# Patient Record
Sex: MALE | Race: WHITE | NOT HISPANIC OR LATINO | Employment: OTHER | RURAL
[De-identification: names, ages, dates, MRNs, and addresses within clinical notes are randomized per-mention and may not be internally consistent; named-entity substitution may affect disease eponyms.]

---

## 2017-01-03 RX ORDER — CLONIDINE HYDROCHLORIDE 0.3 MG/1
TABLET ORAL
Qty: 60 TABLET | Refills: 0 | Status: SHIPPED | OUTPATIENT
Start: 2017-01-03 | End: 2018-01-01 | Stop reason: HOSPADM

## 2017-01-13 ENCOUNTER — OFFICE VISIT (OUTPATIENT)
Dept: FAMILY MEDICINE CLINIC | Facility: CLINIC | Age: 72
End: 2017-01-13

## 2017-01-13 VITALS
BODY MASS INDEX: 37.32 KG/M2 | SYSTOLIC BLOOD PRESSURE: 137 MMHG | HEART RATE: 88 BPM | RESPIRATION RATE: 18 BRPM | HEIGHT: 71 IN | WEIGHT: 266.6 LBS | DIASTOLIC BLOOD PRESSURE: 88 MMHG | OXYGEN SATURATION: 98 % | TEMPERATURE: 97.6 F

## 2017-01-13 DIAGNOSIS — N18.3 CKD (CHRONIC KIDNEY DISEASE), STAGE 3 (MODERATE): ICD-10-CM

## 2017-01-13 DIAGNOSIS — R06.00 DYSPNEA, UNSPECIFIED TYPE: ICD-10-CM

## 2017-01-13 DIAGNOSIS — E11.9 DIABETES MELLITUS TYPE 2, INSULIN DEPENDENT (HCC): Primary | ICD-10-CM

## 2017-01-13 DIAGNOSIS — I10 ESSENTIAL HYPERTENSION: ICD-10-CM

## 2017-01-13 DIAGNOSIS — Z79.4 DIABETES MELLITUS TYPE 2, INSULIN DEPENDENT (HCC): Primary | ICD-10-CM

## 2017-01-13 DIAGNOSIS — F32.A DEPRESSION, UNSPECIFIED DEPRESSION TYPE: ICD-10-CM

## 2017-01-13 DIAGNOSIS — K21.9 GASTROESOPHAGEAL REFLUX DISEASE, ESOPHAGITIS PRESENCE NOT SPECIFIED: ICD-10-CM

## 2017-01-13 PROCEDURE — 99214 OFFICE O/P EST MOD 30 MIN: CPT | Performed by: FAMILY MEDICINE

## 2017-01-13 RX ORDER — CITALOPRAM 10 MG/1
10 TABLET ORAL DAILY
Qty: 30 TABLET | Refills: 11 | Status: SHIPPED | OUTPATIENT
Start: 2017-01-13 | End: 2017-01-13

## 2017-01-13 RX ORDER — ESOMEPRAZOLE MAGNESIUM 10 MG/1
10 GRANULE, FOR SUSPENSION, EXTENDED RELEASE ORAL
Qty: 1 PACKET | Refills: 11 | Status: ON HOLD | OUTPATIENT
Start: 2017-01-13 | End: 2018-01-01

## 2017-01-13 RX ORDER — DULOXETIN HYDROCHLORIDE 60 MG/1
60 CAPSULE, DELAYED RELEASE ORAL DAILY
Qty: 30 CAPSULE | Status: CANCELLED | OUTPATIENT
Start: 2017-01-13

## 2017-01-13 RX ORDER — ALBUTEROL SULFATE 90 UG/1
2 AEROSOL, METERED RESPIRATORY (INHALATION)
Qty: 1 INHALER | Refills: 11 | Status: SHIPPED | OUTPATIENT
Start: 2017-01-13

## 2017-01-13 NOTE — MR AVS SNAPSHOT
Dustin Moulton   1/13/2017 1:15 PM   Office Visit    Dept Phone:  [phone]   Encounter #:  [id number]    Provider:  Doc Cadena MD   Department:  [facility] [place]                Your Full Care Plan              Today's Medication Changes          These changes are accurate as of: 1/13/17  1:29 PM.  If you have any questions, ask your nurse or doctor.               New Medication(s)Ordered:     esomeprazole 10 MG packet   Commonly known as:  NEXIUM   Take 10 mg by mouth Every Morning Before Breakfast.   Started by:  Doc Cadena MD         Stop taking medication(s)listed here:     traZODone 50 MG tablet   Commonly known as:  DESYREL   Stopped by:  Doc Cadena MD                Where to Get Your Medications      These medications were sent to [facility] - [address] - [phone]  - [phone] [address], [facility] [address]     Phone:  [phone]     albuterol 108 (90 BASE) MCG/ACT inhaler    esomeprazole 10 MG packet                  Your Updated Medication List          This list is accurate as of: 1/13/17  1:29 PM.  Always use your most recent med list.                albuterol 108 (90 BASE) MCG/ACT inhaler   Commonly known as:  PROAIR HFA   Inhale 2 puffs 4 (Four) Times a Day.       amLODIPine 10 MG tablet   Commonly known as:  NORVASC   Take 1 tablet by mouth Daily.       atorvastatin 40 MG tablet   Commonly known as:  LIPITOR       * CHLORDIAZEPOXIDE-CLIDINIUM PO       * clidinium-chlordiazePOXIDE 5-2.5 MG per capsule   Commonly known as:  LIBRAX   TAKE 1 CAPSULE FOUR TIMES DAILY       CloNIDine 0.3 MG tablet   Commonly known as:  CATAPRES   TAKE 1 TABLET TWICE DAILY.       esomeprazole 10 MG packet   Commonly known as:  NEXIUM   Take 10 mg by mouth Every Morning Before Breakfast.       fluticasone 50 MCG/ACT nasal spray   Commonly known as:  FLONASE      USE 2 SPRAYS IN EACH NOSTRIL DAILY.       FLUZONE HIGH-DOSE 0.5 ML suspension prefilled syringe injection   Generic drug:  influenza vac split high-dose       * HUMALOG 100 UNIT/ML solution cartridge   Generic drug:  Insulin Lispro       * HUMALOG KWIKPEN 100 UNIT/ML solution pen-injector   Generic drug:  Insulin Lispro       hydrALAZINE 10 MG tablet   Commonly known as:  APRESOLINE       LANTUS 100 UNIT/ML injection   Generic drug:  insulin glargine       levothyroxine 25 MCG tablet   Commonly known as:  SYNTHROID, LEVOTHROID       * Pen Needles 31G X 6 MM misc       * B-D UF III MINI PEN NEEDLES 31G X 5 MM misc   Generic drug:  Insulin Pen Needle   USE 4 TIMES DAILY.       SPS 15 GM/60ML suspension   Generic drug:  sodium polystyrene       tamsulosin 0.4 MG capsule 24 hr capsule   Commonly known as:  FLOMAX   Take 1 capsule by mouth Daily.       torsemide 20 MG tablet   Commonly known as:  DEMADEX       triamcinolone 0.1 % ointment   Commonly known as:  KENALOG       VITAMIN D2 PO       * Notice:  This list has 6 medication(s) that are the same as other medications prescribed for you. Read the directions carefully, and ask your doctor or other care provider to review them with you.            Instructions     None    Patient Instructions History      Upcoming Appointments     Visit Type Date Time Department    OFFICE VISIT 1/13/2017  1:15 PM [facility]    OFFICE VISIT 2/13/2017  2:00 PM [facility]      cooala - your brandshart Signup     Our records indicate that you have declined [facility]oala [facility] sign[facility]. If you would like to sign up for [employer], please email [email or web address] or call [phone] to obtain an activation code.             Other Info from Your Visit           Your Appointments     Feb 13, 2017  2:00 PM CST   Office Visit with Doc Cadena MD   [facility] MEDICAL [facility] (--)    [address] [place]  "[id number]  [facility] [address] [phone]           Arrive 15 minutes prior to appointment.              Allergies     Darvon [Propoxyphene]      Meperidine And Related      Morphine And Related      Other      Darvocet      Reason for Visit     Follow-up 3 month      Vital Signs     Blood Pressure Pulse Temperature Respirations Height    137/88 (BP Location: Right arm, Patient Position: Sitting, Cuff Size: Adult) 88 97.6 °F (36.4 °C) (Tympanic) 18 70.5\" (179.1 cm)    Weight Oxygen Saturation Body Mass Index Smoking Status       266 lb 9.6 oz (121 kg) 98% 37.71 kg/m2 Former Smoker         "

## 2017-01-19 ENCOUNTER — TELEPHONE (OUTPATIENT)
Dept: FAMILY MEDICINE CLINIC | Facility: CLINIC | Age: 72
End: 2017-01-19

## 2017-01-19 NOTE — TELEPHONE ENCOUNTER
Ishan with [facility] called and stated that patient is reporting increase in low back víctor over the last few days.  He stated that he had had a history of back surgery x 3 several years ago.  He had a fa,, on 12/6/16 and it has been increasing since then.  He is requesting a referral to pain management. I do not see anything mentioned in his office notes that he has stated he has back pain.

## 2017-01-23 DIAGNOSIS — M54.5 CHRONIC LOW BACK PAIN, UNSPECIFIED BACK PAIN LATERALITY, WITH SCIATICA PRESENCE UNSPECIFIED: Primary | ICD-10-CM

## 2017-01-23 DIAGNOSIS — G89.29 CHRONIC LOW BACK PAIN, UNSPECIFIED BACK PAIN LATERALITY, WITH SCIATICA PRESENCE UNSPECIFIED: Primary | ICD-10-CM

## 2017-02-01 RX ORDER — AMLODIPINE BESYLATE 10 MG/1
10 TABLET ORAL DAILY
Qty: 90 TABLET | Refills: 3 | Status: SHIPPED | OUTPATIENT
Start: 2017-02-01

## 2017-04-12 RX ORDER — FLURBIPROFEN SODIUM 0.3 MG/ML
SOLUTION/ DROPS OPHTHALMIC
Qty: 120 EACH | Refills: 11 | Status: SHIPPED | OUTPATIENT
Start: 2017-04-12

## 2017-06-22 RX ORDER — ATORVASTATIN CALCIUM 40 MG/1
TABLET, FILM COATED ORAL
Qty: 30 TABLET | Refills: 0 | Status: SHIPPED | OUTPATIENT
Start: 2017-06-22 | End: 2017-08-22 | Stop reason: SDUPTHER

## 2017-08-22 RX ORDER — ATORVASTATIN CALCIUM 40 MG/1
TABLET, FILM COATED ORAL
Qty: 30 TABLET | Refills: 0 | Status: SHIPPED | OUTPATIENT
Start: 2017-08-22

## 2018-01-01 ENCOUNTER — READMISSION MANAGEMENT (OUTPATIENT)
Dept: CALL CENTER | Facility: HOSPITAL | Age: 73
End: 2018-01-01

## 2018-01-01 ENCOUNTER — TELEPHONE (OUTPATIENT)
Dept: FAMILY MEDICINE CLINIC | Facility: CLINIC | Age: 73
End: 2018-01-01

## 2018-01-01 ENCOUNTER — APPOINTMENT (OUTPATIENT)
Dept: CT IMAGING | Facility: HOSPITAL | Age: 73
End: 2018-01-01

## 2018-01-01 ENCOUNTER — APPOINTMENT (OUTPATIENT)
Dept: CARDIOLOGY | Facility: HOSPITAL | Age: 73
End: 2018-01-01
Attending: INTERNAL MEDICINE

## 2018-01-01 ENCOUNTER — HOSPITAL ENCOUNTER (OUTPATIENT)
Facility: HOSPITAL | Age: 73
Setting detail: OBSERVATION
Discharge: HOME | End: 2018-06-03
Attending: EMERGENCY MEDICINE | Admitting: EMERGENCY MEDICINE

## 2018-01-01 ENCOUNTER — APPOINTMENT (OUTPATIENT)
Dept: GENERAL RADIOLOGY | Facility: HOSPITAL | Age: 73
End: 2018-01-01

## 2018-01-01 ENCOUNTER — OFFICE VISIT (OUTPATIENT)
Dept: FAMILY MEDICINE CLINIC | Facility: CLINIC | Age: 73
End: 2018-01-01

## 2018-01-01 ENCOUNTER — LAB (OUTPATIENT)
Dept: LAB | Facility: HOSPITAL | Age: 73
End: 2018-01-01

## 2018-01-01 ENCOUNTER — HOSPITAL ENCOUNTER (INPATIENT)
Facility: HOSPITAL | Age: 73
LOS: 5 days | Discharge: HOME | End: 2018-08-25
Attending: EMERGENCY MEDICINE | Admitting: HOSPITALIST

## 2018-01-01 ENCOUNTER — NURSE TRIAGE (OUTPATIENT)
Dept: CALL CENTER | Facility: HOSPITAL | Age: 73
End: 2018-01-01

## 2018-01-01 VITALS
SYSTOLIC BLOOD PRESSURE: 105 MMHG | WEIGHT: 245 LBS | RESPIRATION RATE: 20 BRPM | DIASTOLIC BLOOD PRESSURE: 53 MMHG | HEIGHT: 71 IN | HEART RATE: 62 BPM | OXYGEN SATURATION: 98 % | TEMPERATURE: 98.4 F | BODY MASS INDEX: 34.3 KG/M2

## 2018-01-01 VITALS
WEIGHT: 234.2 LBS | DIASTOLIC BLOOD PRESSURE: 68 MMHG | HEART RATE: 57 BPM | RESPIRATION RATE: 18 BRPM | HEIGHT: 72 IN | OXYGEN SATURATION: 97 % | TEMPERATURE: 97.6 F | SYSTOLIC BLOOD PRESSURE: 145 MMHG | BODY MASS INDEX: 31.72 KG/M2

## 2018-01-01 VITALS
HEART RATE: 91 BPM | OXYGEN SATURATION: 97 % | BODY MASS INDEX: 32.13 KG/M2 | HEIGHT: 72 IN | SYSTOLIC BLOOD PRESSURE: 134 MMHG | WEIGHT: 237.25 LBS | DIASTOLIC BLOOD PRESSURE: 62 MMHG

## 2018-01-01 DIAGNOSIS — Z79.4 DIABETES MELLITUS TYPE 2, INSULIN DEPENDENT (HCC): ICD-10-CM

## 2018-01-01 DIAGNOSIS — N17.9 AKI (ACUTE KIDNEY INJURY) (HCC): Primary | ICD-10-CM

## 2018-01-01 DIAGNOSIS — E03.9 HYPOTHYROIDISM, UNSPECIFIED TYPE: ICD-10-CM

## 2018-01-01 DIAGNOSIS — N18.9 CHRONIC KIDNEY DISEASE, UNSPECIFIED CKD STAGE: ICD-10-CM

## 2018-01-01 DIAGNOSIS — E11.9 DIABETES MELLITUS TYPE 2, INSULIN DEPENDENT (HCC): ICD-10-CM

## 2018-01-01 DIAGNOSIS — J18.9 PNEUMONIA OF RIGHT UPPER LOBE DUE TO INFECTIOUS ORGANISM: Primary | ICD-10-CM

## 2018-01-01 DIAGNOSIS — I16.0 HYPERTENSIVE URGENCY: ICD-10-CM

## 2018-01-01 DIAGNOSIS — I21.4 NSTEMI (NON-ST ELEVATED MYOCARDIAL INFARCTION) (HCC): Primary | ICD-10-CM

## 2018-01-01 DIAGNOSIS — I10 UNCONTROLLED HYPERTENSION: ICD-10-CM

## 2018-01-01 DIAGNOSIS — I10 ESSENTIAL HYPERTENSION: ICD-10-CM

## 2018-01-01 LAB
ALBUMIN SERPL-MCNC: 3.7 G/DL (ref 3.4–4.8)
ALBUMIN SERPL-MCNC: 4 G/DL (ref 3.4–4.8)
ALBUMIN SERPL-MCNC: 4.1 G/DL (ref 3.4–4.8)
ALBUMIN/GLOB SERPL: 1.1 G/DL (ref 1.1–1.8)
ALBUMIN/GLOB SERPL: 1.3 G/DL (ref 1.1–1.8)
ALBUMIN/GLOB SERPL: 1.3 G/DL (ref 1.1–1.8)
ALP SERPL-CCNC: 107 U/L (ref 38–126)
ALP SERPL-CCNC: 42 U/L (ref 38–126)
ALP SERPL-CCNC: 73 U/L (ref 38–126)
ALT SERPL W P-5'-P-CCNC: 17 U/L (ref 21–72)
ALT SERPL W P-5'-P-CCNC: 26 U/L (ref 21–72)
ALT SERPL W P-5'-P-CCNC: 30 U/L (ref 21–72)
AMYLASE SERPL-CCNC: 38 U/L (ref 50–130)
ANION GAP SERPL CALCULATED.3IONS-SCNC: 13 MMOL/L (ref 5–15)
ANION GAP SERPL CALCULATED.3IONS-SCNC: 15 MMOL/L (ref 5–15)
ANION GAP SERPL CALCULATED.3IONS-SCNC: 6 MMOL/L (ref 5–15)
ANION GAP SERPL CALCULATED.3IONS-SCNC: 7 MMOL/L (ref 5–15)
ANION GAP SERPL CALCULATED.3IONS-SCNC: 8 MMOL/L (ref 5–15)
ANION GAP SERPL CALCULATED.3IONS-SCNC: 9 MMOL/L (ref 5–15)
ARTICHOKE IGE QN: 87 MG/DL (ref 1–129)
AST SERPL-CCNC: 23 U/L (ref 17–59)
AST SERPL-CCNC: 26 U/L (ref 17–59)
AST SERPL-CCNC: 26 U/L (ref 17–59)
BACTERIA BLD CULT: ABNORMAL
BACTERIA SPEC AEROBE CULT: ABNORMAL
BACTERIA SPEC AEROBE CULT: NORMAL
BACTERIA SPEC RESP CULT: NORMAL
BACTERIA UR QL AUTO: ABNORMAL /HPF
BACTERIA UR QL AUTO: NORMAL /HPF
BASOPHILS # BLD AUTO: 0.02 10*3/MM3 (ref 0–0.2)
BASOPHILS # BLD AUTO: 0.03 10*3/MM3 (ref 0–0.2)
BASOPHILS # BLD AUTO: 0.03 10*3/MM3 (ref 0–0.2)
BASOPHILS # BLD AUTO: 0.04 10*3/MM3 (ref 0–0.2)
BASOPHILS # BLD AUTO: 0.05 10*3/MM3 (ref 0–0.2)
BASOPHILS # BLD AUTO: 0.05 10*3/MM3 (ref 0–0.2)
BASOPHILS # BLD AUTO: 0.06 10*3/MM3 (ref 0–0.2)
BASOPHILS # BLD AUTO: 0.06 10*3/MM3 (ref 0–0.2)
BASOPHILS NFR BLD AUTO: 0.3 % (ref 0–2)
BASOPHILS NFR BLD AUTO: 0.3 % (ref 0–2)
BASOPHILS NFR BLD AUTO: 0.4 % (ref 0–2)
BASOPHILS NFR BLD AUTO: 0.5 % (ref 0–2)
BASOPHILS NFR BLD AUTO: 0.6 % (ref 0–2)
BASOPHILS NFR BLD AUTO: 0.6 % (ref 0–2)
BH CV ECHO MEAS - ACS: 1.8 CM
BH CV ECHO MEAS - AO MAX PG (FULL): 3 MMHG
BH CV ECHO MEAS - AO MAX PG: 11.3 MMHG
BH CV ECHO MEAS - AO MEAN PG (FULL): 1 MMHG
BH CV ECHO MEAS - AO MEAN PG: 5 MMHG
BH CV ECHO MEAS - AO ROOT AREA (BSA CORRECTED): 1.6
BH CV ECHO MEAS - AO ROOT AREA: 9.6 CM^2
BH CV ECHO MEAS - AO ROOT DIAM: 3.5 CM
BH CV ECHO MEAS - AO V2 MAX: 168 CM/SEC
BH CV ECHO MEAS - AO V2 MEAN: 106 CM/SEC
BH CV ECHO MEAS - AO V2 VTI: 35.2 CM
BH CV ECHO MEAS - AVA(I,A): 2.6 CM^2
BH CV ECHO MEAS - AVA(I,D): 2.6 CM^2
BH CV ECHO MEAS - AVA(V,A): 2.7 CM^2
BH CV ECHO MEAS - AVA(V,D): 2.7 CM^2
BH CV ECHO MEAS - BSA(HAYCOCK): 2.3 M^2
BH CV ECHO MEAS - BSA: 2.2 M^2
BH CV ECHO MEAS - BZI_BMI: 32.2 KILOGRAMS/M^2
BH CV ECHO MEAS - BZI_METRIC_HEIGHT: 180.3 CM
BH CV ECHO MEAS - BZI_METRIC_WEIGHT: 104.8 KG
BH CV ECHO MEAS - EDV(CUBED): 77.3 ML
BH CV ECHO MEAS - EDV(TEICH): 81.3 ML
BH CV ECHO MEAS - EF(CUBED): 76.7 %
BH CV ECHO MEAS - EF(TEICH): 69.1 %
BH CV ECHO MEAS - ESV(CUBED): 18 ML
BH CV ECHO MEAS - ESV(TEICH): 25.1 ML
BH CV ECHO MEAS - FS: 38.5 %
BH CV ECHO MEAS - IVS/LVPW: 1
BH CV ECHO MEAS - IVSD: 1.3 CM
BH CV ECHO MEAS - LA DIMENSION: 4.7 CM
BH CV ECHO MEAS - LA/AO: 1.3
BH CV ECHO MEAS - LV MASS(C)D: 200.2 GRAMS
BH CV ECHO MEAS - LV MASS(C)DI: 89.3 GRAMS/M^2
BH CV ECHO MEAS - LV MAX PG: 8.3 MMHG
BH CV ECHO MEAS - LV MEAN PG: 4 MMHG
BH CV ECHO MEAS - LV V1 MAX: 144 CM/SEC
BH CV ECHO MEAS - LV V1 MEAN: 89.5 CM/SEC
BH CV ECHO MEAS - LV V1 VTI: 29.3 CM
BH CV ECHO MEAS - LVIDD: 4.3 CM
BH CV ECHO MEAS - LVIDS: 2.6 CM
BH CV ECHO MEAS - LVOT AREA (M): 3.1 CM^2
BH CV ECHO MEAS - LVOT AREA: 3.1 CM^2
BH CV ECHO MEAS - LVOT DIAM: 2 CM
BH CV ECHO MEAS - LVPWD: 1.3 CM
BH CV ECHO MEAS - MR MAX PG: 107.7 MMHG
BH CV ECHO MEAS - MR MAX VEL: 519 CM/SEC
BH CV ECHO MEAS - MV A MAX VEL: 141 CM/SEC
BH CV ECHO MEAS - MV DEC SLOPE: 735 CM/SEC^2
BH CV ECHO MEAS - MV E MAX VEL: 118 CM/SEC
BH CV ECHO MEAS - MV E/A: 0.84
BH CV ECHO MEAS - MV MAX PG: 8.5 MMHG
BH CV ECHO MEAS - MV MEAN PG: 3 MMHG
BH CV ECHO MEAS - MV P1/2T MAX VEL: 123 CM/SEC
BH CV ECHO MEAS - MV P1/2T: 49 MSEC
BH CV ECHO MEAS - MV V2 MAX: 146 CM/SEC
BH CV ECHO MEAS - MV V2 MEAN: 79.9 CM/SEC
BH CV ECHO MEAS - MV V2 VTI: 42.7 CM
BH CV ECHO MEAS - MVA P1/2T LCG: 1.8 CM^2
BH CV ECHO MEAS - MVA(P1/2T): 4.5 CM^2
BH CV ECHO MEAS - MVA(VTI): 2.2 CM^2
BH CV ECHO MEAS - PA MAX PG: 8.3 MMHG
BH CV ECHO MEAS - PA V2 MAX: 144 CM/SEC
BH CV ECHO MEAS - RAP SYSTOLE: 5 MMHG
BH CV ECHO MEAS - RVDD: 2.3 CM
BH CV ECHO MEAS - RVSP: 22.5 MMHG
BH CV ECHO MEAS - SI(AO): 151 ML/M^2
BH CV ECHO MEAS - SI(CUBED): 26.5 ML/M^2
BH CV ECHO MEAS - SI(LVOT): 41.1 ML/M^2
BH CV ECHO MEAS - SI(TEICH): 25.1 ML/M^2
BH CV ECHO MEAS - SV(AO): 338.7 ML
BH CV ECHO MEAS - SV(CUBED): 59.3 ML
BH CV ECHO MEAS - SV(LVOT): 92 ML
BH CV ECHO MEAS - SV(TEICH): 56.2 ML
BH CV ECHO MEAS - TR MAX VEL: 209 CM/SEC
BILIRUB SERPL-MCNC: 0.2 MG/DL (ref 0.2–1.3)
BILIRUB SERPL-MCNC: 0.9 MG/DL (ref 0.2–1.3)
BILIRUB SERPL-MCNC: 1.2 MG/DL (ref 0.2–1.3)
BILIRUB UR QL STRIP: NEGATIVE
BILIRUB UR QL STRIP: NEGATIVE
BUN BLD-MCNC: 31 MG/DL (ref 7–21)
BUN BLD-MCNC: 31 MG/DL (ref 7–21)
BUN BLD-MCNC: 33 MG/DL (ref 7–21)
BUN BLD-MCNC: 33 MG/DL (ref 7–21)
BUN BLD-MCNC: 34 MG/DL (ref 7–21)
BUN BLD-MCNC: 48 MG/DL (ref 7–21)
BUN BLD-MCNC: 50 MG/DL (ref 7–21)
BUN BLD-MCNC: 51 MG/DL (ref 7–21)
BUN BLD-MCNC: 52 MG/DL (ref 7–21)
BUN/CREAT SERPL: 13.9 (ref 7–25)
BUN/CREAT SERPL: 14.2 (ref 7–25)
BUN/CREAT SERPL: 14.7 (ref 7–25)
BUN/CREAT SERPL: 15.4 (ref 7–25)
BUN/CREAT SERPL: 15.8 (ref 7–25)
BUN/CREAT SERPL: 16 (ref 7–25)
BUN/CREAT SERPL: 16.6 (ref 7–25)
BUN/CREAT SERPL: 17.3 (ref 7–25)
BUN/CREAT SERPL: 17.5 (ref 7–25)
BUN/CREAT SERPL: 17.8 (ref 7–25)
BUN/CREAT SERPL: 17.9 (ref 7–25)
CALCIUM SPEC-SCNC: 8.1 MG/DL (ref 8.4–10.2)
CALCIUM SPEC-SCNC: 8.2 MG/DL (ref 8.4–10.2)
CALCIUM SPEC-SCNC: 8.3 MG/DL (ref 8.4–10.2)
CALCIUM SPEC-SCNC: 8.4 MG/DL (ref 8.4–10.2)
CALCIUM SPEC-SCNC: 8.5 MG/DL (ref 8.4–10.2)
CALCIUM SPEC-SCNC: 8.6 MG/DL (ref 8.4–10.2)
CALCIUM SPEC-SCNC: 8.7 MG/DL (ref 8.4–10.2)
CALCIUM SPEC-SCNC: 8.7 MG/DL (ref 8.4–10.2)
CALCIUM SPEC-SCNC: 8.9 MG/DL (ref 8.4–10.2)
CALCIUM SPEC-SCNC: 9.4 MG/DL (ref 8.4–10.2)
CALCIUM SPEC-SCNC: 9.4 MG/DL (ref 8.4–10.2)
CHLORIDE SERPL-SCNC: 100 MMOL/L (ref 95–110)
CHLORIDE SERPL-SCNC: 101 MMOL/L (ref 95–110)
CHLORIDE SERPL-SCNC: 102 MMOL/L (ref 95–110)
CHLORIDE SERPL-SCNC: 103 MMOL/L (ref 95–110)
CHLORIDE SERPL-SCNC: 104 MMOL/L (ref 95–110)
CHLORIDE SERPL-SCNC: 104 MMOL/L (ref 95–110)
CHLORIDE SERPL-SCNC: 105 MMOL/L (ref 95–110)
CHLORIDE SERPL-SCNC: 107 MMOL/L (ref 95–110)
CHLORIDE SERPL-SCNC: 107 MMOL/L (ref 95–110)
CHOLEST SERPL-MCNC: 159 MG/DL (ref 0–199)
CLARITY UR: CLEAR
CLARITY UR: CLEAR
CO2 SERPL-SCNC: 16 MMOL/L (ref 22–31)
CO2 SERPL-SCNC: 17 MMOL/L (ref 22–31)
CO2 SERPL-SCNC: 19 MMOL/L (ref 22–31)
CO2 SERPL-SCNC: 20 MMOL/L (ref 22–31)
CO2 SERPL-SCNC: 20 MMOL/L (ref 22–31)
CO2 SERPL-SCNC: 21 MMOL/L (ref 22–31)
CO2 SERPL-SCNC: 22 MMOL/L (ref 22–31)
CO2 SERPL-SCNC: 23 MMOL/L (ref 22–31)
COLOR UR: YELLOW
COLOR UR: YELLOW
CREAT BLD-MCNC: 2.05 MG/DL (ref 0.7–1.3)
CREAT BLD-MCNC: 2.09 MG/DL (ref 0.7–1.3)
CREAT BLD-MCNC: 2.12 MG/DL (ref 0.7–1.3)
CREAT BLD-MCNC: 2.18 MG/DL (ref 0.7–1.3)
CREAT BLD-MCNC: 2.21 MG/DL (ref 0.7–1.3)
CREAT BLD-MCNC: 2.23 MG/DL (ref 0.7–1.3)
CREAT BLD-MCNC: 2.24 MG/DL (ref 0.7–1.3)
CREAT BLD-MCNC: 2.7 MG/DL (ref 0.7–1.3)
CREAT BLD-MCNC: 2.85 MG/DL (ref 0.7–1.3)
CREAT BLD-MCNC: 2.9 MG/DL (ref 0.7–1.3)
CREAT BLD-MCNC: 2.95 MG/DL (ref 0.7–1.3)
D-LACTATE SERPL-SCNC: 1.8 MMOL/L (ref 0.5–2)
DEPRECATED RDW RBC AUTO: 40 FL (ref 35.1–43.9)
DEPRECATED RDW RBC AUTO: 40.4 FL (ref 35.1–43.9)
DEPRECATED RDW RBC AUTO: 40.7 FL (ref 35.1–43.9)
DEPRECATED RDW RBC AUTO: 40.7 FL (ref 35.1–43.9)
DEPRECATED RDW RBC AUTO: 42.2 FL (ref 35.1–43.9)
DEPRECATED RDW RBC AUTO: 42.3 FL (ref 35.1–43.9)
DEPRECATED RDW RBC AUTO: 42.6 FL (ref 35.1–43.9)
DEPRECATED RDW RBC AUTO: 43.3 FL (ref 35.1–43.9)
DEPRECATED RDW RBC AUTO: 43.4 FL (ref 35.1–43.9)
DEPRECATED RDW RBC AUTO: 44.2 FL (ref 35.1–43.9)
DEPRECATED RDW RBC AUTO: 46.1 FL (ref 35.1–43.9)
EOSINOPHIL # BLD AUTO: 0.17 10*3/MM3 (ref 0–0.7)
EOSINOPHIL # BLD AUTO: 0.2 10*3/MM3 (ref 0–0.7)
EOSINOPHIL # BLD AUTO: 0.21 10*3/MM3 (ref 0–0.7)
EOSINOPHIL # BLD AUTO: 0.25 10*3/MM3 (ref 0–0.7)
EOSINOPHIL # BLD AUTO: 0.25 10*3/MM3 (ref 0–0.7)
EOSINOPHIL # BLD AUTO: 0.26 10*3/MM3 (ref 0–0.7)
EOSINOPHIL # BLD AUTO: 0.37 10*3/MM3 (ref 0–0.7)
EOSINOPHIL # BLD AUTO: 0.52 10*3/MM3 (ref 0–0.7)
EOSINOPHIL # BLD AUTO: 0.53 10*3/MM3 (ref 0–0.7)
EOSINOPHIL # BLD AUTO: 0.56 10*3/MM3 (ref 0–0.7)
EOSINOPHIL # BLD AUTO: 0.6 10*3/MM3 (ref 0–0.7)
EOSINOPHIL NFR BLD AUTO: 1 % (ref 0–7)
EOSINOPHIL NFR BLD AUTO: 2 % (ref 0–7)
EOSINOPHIL NFR BLD AUTO: 3 % (ref 0–7)
EOSINOPHIL NFR BLD AUTO: 3.1 % (ref 0–7)
EOSINOPHIL NFR BLD AUTO: 3.4 % (ref 0–7)
EOSINOPHIL NFR BLD AUTO: 5.7 % (ref 0–7)
EOSINOPHIL NFR BLD AUTO: 5.8 % (ref 0–7)
EOSINOPHIL NFR BLD AUTO: 5.8 % (ref 0–7)
EOSINOPHIL NFR BLD AUTO: 6 % (ref 0–7)
ERYTHROCYTE [DISTWIDTH] IN BLOOD BY AUTOMATED COUNT: 12.4 % (ref 11.5–14.5)
ERYTHROCYTE [DISTWIDTH] IN BLOOD BY AUTOMATED COUNT: 12.5 % (ref 11.5–14.5)
ERYTHROCYTE [DISTWIDTH] IN BLOOD BY AUTOMATED COUNT: 12.7 % (ref 11.5–14.5)
ERYTHROCYTE [DISTWIDTH] IN BLOOD BY AUTOMATED COUNT: 12.8 % (ref 11.5–14.5)
ERYTHROCYTE [DISTWIDTH] IN BLOOD BY AUTOMATED COUNT: 12.9 % (ref 11.5–14.5)
ERYTHROCYTE [DISTWIDTH] IN BLOOD BY AUTOMATED COUNT: 13 % (ref 11.5–14.5)
ERYTHROCYTE [DISTWIDTH] IN BLOOD BY AUTOMATED COUNT: 13.3 % (ref 11.5–14.5)
GFR SERPL CREATININE-BSD FRML MDRD: 21 ML/MIN/1.73 (ref 42–98)
GFR SERPL CREATININE-BSD FRML MDRD: 21 ML/MIN/1.73 (ref 42–98)
GFR SERPL CREATININE-BSD FRML MDRD: 22 ML/MIN/1.73 (ref 42–98)
GFR SERPL CREATININE-BSD FRML MDRD: 23 ML/MIN/1.73 (ref 42–98)
GFR SERPL CREATININE-BSD FRML MDRD: 29 ML/MIN/1.73 (ref 42–98)
GFR SERPL CREATININE-BSD FRML MDRD: 30 ML/MIN/1.73 (ref 42–98)
GFR SERPL CREATININE-BSD FRML MDRD: 31 ML/MIN/1.73 (ref 42–98)
GFR SERPL CREATININE-BSD FRML MDRD: 31 ML/MIN/1.73 (ref 42–98)
GFR SERPL CREATININE-BSD FRML MDRD: 32 ML/MIN/1.73 (ref 42–98)
GLOBULIN UR ELPH-MCNC: 2.8 GM/DL (ref 2.3–3.5)
GLOBULIN UR ELPH-MCNC: 3.2 GM/DL (ref 2.3–3.5)
GLOBULIN UR ELPH-MCNC: 3.6 GM/DL (ref 2.3–3.5)
GLUCOSE BLD-MCNC: 103 MG/DL (ref 60–100)
GLUCOSE BLD-MCNC: 105 MG/DL (ref 60–100)
GLUCOSE BLD-MCNC: 107 MG/DL (ref 60–100)
GLUCOSE BLD-MCNC: 110 MG/DL (ref 60–100)
GLUCOSE BLD-MCNC: 167 MG/DL (ref 60–100)
GLUCOSE BLD-MCNC: 194 MG/DL (ref 60–100)
GLUCOSE BLD-MCNC: 220 MG/DL (ref 60–100)
GLUCOSE BLD-MCNC: 231 MG/DL (ref 60–100)
GLUCOSE BLD-MCNC: 333 MG/DL (ref 60–100)
GLUCOSE BLD-MCNC: 94 MG/DL (ref 60–100)
GLUCOSE BLD-MCNC: 94 MG/DL (ref 60–100)
GLUCOSE BLDC GLUCOMTR-MCNC: 102 MG/DL (ref 70–130)
GLUCOSE BLDC GLUCOMTR-MCNC: 117 MG/DL (ref 70–130)
GLUCOSE BLDC GLUCOMTR-MCNC: 120 MG/DL (ref 70–130)
GLUCOSE BLDC GLUCOMTR-MCNC: 122 MG/DL (ref 70–130)
GLUCOSE BLDC GLUCOMTR-MCNC: 126 MG/DL (ref 70–130)
GLUCOSE BLDC GLUCOMTR-MCNC: 131 MG/DL (ref 70–130)
GLUCOSE BLDC GLUCOMTR-MCNC: 139 MG/DL (ref 70–130)
GLUCOSE BLDC GLUCOMTR-MCNC: 150 MG/DL (ref 70–130)
GLUCOSE BLDC GLUCOMTR-MCNC: 152 MG/DL (ref 70–130)
GLUCOSE BLDC GLUCOMTR-MCNC: 165 MG/DL (ref 70–130)
GLUCOSE BLDC GLUCOMTR-MCNC: 181 MG/DL (ref 70–130)
GLUCOSE BLDC GLUCOMTR-MCNC: 208 MG/DL (ref 70–130)
GLUCOSE BLDC GLUCOMTR-MCNC: 211 MG/DL (ref 70–130)
GLUCOSE BLDC GLUCOMTR-MCNC: 220 MG/DL (ref 70–130)
GLUCOSE BLDC GLUCOMTR-MCNC: 223 MG/DL (ref 70–130)
GLUCOSE BLDC GLUCOMTR-MCNC: 240 MG/DL (ref 70–130)
GLUCOSE BLDC GLUCOMTR-MCNC: 250 MG/DL (ref 70–130)
GLUCOSE BLDC GLUCOMTR-MCNC: 253 MG/DL (ref 70–130)
GLUCOSE BLDC GLUCOMTR-MCNC: 268 MG/DL (ref 70–130)
GLUCOSE BLDC GLUCOMTR-MCNC: 271 MG/DL (ref 70–130)
GLUCOSE BLDC GLUCOMTR-MCNC: 272 MG/DL (ref 70–130)
GLUCOSE BLDC GLUCOMTR-MCNC: 276 MG/DL (ref 70–130)
GLUCOSE BLDC GLUCOMTR-MCNC: 283 MG/DL (ref 70–130)
GLUCOSE BLDC GLUCOMTR-MCNC: 285 MG/DL (ref 70–130)
GLUCOSE BLDC GLUCOMTR-MCNC: 286 MG/DL (ref 70–130)
GLUCOSE BLDC GLUCOMTR-MCNC: 298 MG/DL (ref 70–130)
GLUCOSE BLDC GLUCOMTR-MCNC: 306 MG/DL (ref 70–130)
GLUCOSE BLDC GLUCOMTR-MCNC: 309 MG/DL (ref 70–130)
GLUCOSE BLDC GLUCOMTR-MCNC: 320 MG/DL (ref 70–130)
GLUCOSE BLDC GLUCOMTR-MCNC: 341 MG/DL (ref 70–130)
GLUCOSE BLDC GLUCOMTR-MCNC: 361 MG/DL (ref 70–130)
GLUCOSE BLDC GLUCOMTR-MCNC: 362 MG/DL (ref 70–130)
GLUCOSE BLDC GLUCOMTR-MCNC: 369 MG/DL (ref 70–130)
GLUCOSE BLDC GLUCOMTR-MCNC: 383 MG/DL (ref 70–130)
GLUCOSE BLDC GLUCOMTR-MCNC: 62 MG/DL (ref 70–130)
GLUCOSE UR STRIP-MCNC: ABNORMAL MG/DL
GLUCOSE UR STRIP-MCNC: NEGATIVE MG/DL
GRAM STN SPEC: ABNORMAL
GRAM STN SPEC: NORMAL
HBA1C MFR BLD: 7.6 % (ref 4–5.6)
HBA1C MFR BLD: 8.1 % (ref 4–5.6)
HCT VFR BLD AUTO: 26.8 % (ref 39–49)
HCT VFR BLD AUTO: 27 % (ref 39–49)
HCT VFR BLD AUTO: 27.4 % (ref 39–49)
HCT VFR BLD AUTO: 27.5 % (ref 39–49)
HCT VFR BLD AUTO: 28.9 % (ref 39–49)
HCT VFR BLD AUTO: 29.8 % (ref 39–49)
HCT VFR BLD AUTO: 30.4 % (ref 39–49)
HCT VFR BLD AUTO: 31.9 % (ref 39–49)
HCT VFR BLD AUTO: 32.4 % (ref 39–49)
HCT VFR BLD AUTO: 32.9 % (ref 39–49)
HCT VFR BLD AUTO: 33.3 % (ref 39–49)
HDLC SERPL-MCNC: 41 MG/DL (ref 60–200)
HGB BLD-MCNC: 10 G/DL (ref 13.7–17.3)
HGB BLD-MCNC: 10.2 G/DL (ref 13.7–17.3)
HGB BLD-MCNC: 10.3 G/DL (ref 13.7–17.3)
HGB BLD-MCNC: 10.7 G/DL (ref 13.7–17.3)
HGB BLD-MCNC: 10.7 G/DL (ref 13.7–17.3)
HGB BLD-MCNC: 10.8 G/DL (ref 13.7–17.3)
HGB BLD-MCNC: 11.4 G/DL (ref 13.7–17.3)
HGB BLD-MCNC: 11.9 G/DL (ref 13.7–17.3)
HGB BLD-MCNC: 12 G/DL (ref 13.7–17.3)
HGB BLD-MCNC: 9.3 G/DL (ref 13.7–17.3)
HGB BLD-MCNC: 9.8 G/DL (ref 13.7–17.3)
HGB UR QL STRIP.AUTO: NEGATIVE
HGB UR QL STRIP.AUTO: NEGATIVE
HOLD SPECIMEN: NORMAL
HYALINE CASTS UR QL AUTO: ABNORMAL /LPF
HYALINE CASTS UR QL AUTO: NORMAL /LPF
IMM GRANULOCYTES # BLD: 0.02 10*3/MM3 (ref 0–0.02)
IMM GRANULOCYTES # BLD: 0.03 10*3/MM3 (ref 0–0.02)
IMM GRANULOCYTES # BLD: 0.03 10*3/MM3 (ref 0–0.02)
IMM GRANULOCYTES # BLD: 0.04 10*3/MM3 (ref 0–0.02)
IMM GRANULOCYTES # BLD: 0.05 10*3/MM3 (ref 0–0.02)
IMM GRANULOCYTES # BLD: 0.07 10*3/MM3 (ref 0–0.02)
IMM GRANULOCYTES # BLD: 0.07 10*3/MM3 (ref 0–0.02)
IMM GRANULOCYTES # BLD: 0.08 10*3/MM3 (ref 0–0.02)
IMM GRANULOCYTES # BLD: 0.13 10*3/MM3 (ref 0–0.02)
IMM GRANULOCYTES NFR BLD: 0.2 % (ref 0–0.5)
IMM GRANULOCYTES NFR BLD: 0.4 % (ref 0–0.5)
IMM GRANULOCYTES NFR BLD: 0.5 % (ref 0–0.5)
IMM GRANULOCYTES NFR BLD: 0.5 % (ref 0–0.5)
IMM GRANULOCYTES NFR BLD: 0.6 % (ref 0–0.5)
IMM GRANULOCYTES NFR BLD: 0.6 % (ref 0–0.5)
IMM GRANULOCYTES NFR BLD: 0.8 % (ref 0–0.5)
IMM GRANULOCYTES NFR BLD: 0.9 % (ref 0–0.5)
IMM GRANULOCYTES NFR BLD: 1.2 % (ref 0–0.5)
KETONES UR QL STRIP: NEGATIVE
KETONES UR QL STRIP: NEGATIVE
L PNEUMO1 AG UR QL IA: NEGATIVE
LDLC/HDLC SERPL: 1.98 {RATIO} (ref 0–3.55)
LEUKOCYTE ESTERASE UR QL STRIP.AUTO: NEGATIVE
LEUKOCYTE ESTERASE UR QL STRIP.AUTO: NEGATIVE
LIPASE SERPL-CCNC: 25 U/L (ref 23–300)
LV EF 2D ECHO EST: 56 %
LYMPHOCYTES # BLD AUTO: 1.12 10*3/MM3 (ref 0.6–4.2)
LYMPHOCYTES # BLD AUTO: 1.22 10*3/MM3 (ref 0.6–4.2)
LYMPHOCYTES # BLD AUTO: 1.25 10*3/MM3 (ref 0.6–4.2)
LYMPHOCYTES # BLD AUTO: 1.29 10*3/MM3 (ref 0.6–4.2)
LYMPHOCYTES # BLD AUTO: 1.33 10*3/MM3 (ref 0.6–4.2)
LYMPHOCYTES # BLD AUTO: 1.4 10*3/MM3 (ref 0.6–4.2)
LYMPHOCYTES # BLD AUTO: 1.44 10*3/MM3 (ref 0.6–4.2)
LYMPHOCYTES # BLD AUTO: 1.45 10*3/MM3 (ref 0.6–4.2)
LYMPHOCYTES # BLD AUTO: 1.52 10*3/MM3 (ref 0.6–4.2)
LYMPHOCYTES # BLD AUTO: 1.55 10*3/MM3 (ref 0.6–4.2)
LYMPHOCYTES # BLD AUTO: 1.69 10*3/MM3 (ref 0.6–4.2)
LYMPHOCYTES NFR BLD AUTO: 10.9 % (ref 10–50)
LYMPHOCYTES NFR BLD AUTO: 11.2 % (ref 10–50)
LYMPHOCYTES NFR BLD AUTO: 12.6 % (ref 10–50)
LYMPHOCYTES NFR BLD AUTO: 16.2 % (ref 10–50)
LYMPHOCYTES NFR BLD AUTO: 16.2 % (ref 10–50)
LYMPHOCYTES NFR BLD AUTO: 16.9 % (ref 10–50)
LYMPHOCYTES NFR BLD AUTO: 17.3 % (ref 10–50)
LYMPHOCYTES NFR BLD AUTO: 17.4 % (ref 10–50)
LYMPHOCYTES NFR BLD AUTO: 17.9 % (ref 10–50)
LYMPHOCYTES NFR BLD AUTO: 19.7 % (ref 10–50)
LYMPHOCYTES NFR BLD AUTO: 8.4 % (ref 10–50)
MAXIMAL PREDICTED HEART RATE: 147 BPM
MCH RBC QN AUTO: 31.9 PG (ref 26.5–34)
MCH RBC QN AUTO: 32.1 PG (ref 26.5–34)
MCH RBC QN AUTO: 32.2 PG (ref 26.5–34)
MCH RBC QN AUTO: 32.3 PG (ref 26.5–34)
MCH RBC QN AUTO: 32.3 PG (ref 26.5–34)
MCH RBC QN AUTO: 32.4 PG (ref 26.5–34)
MCH RBC QN AUTO: 32.5 PG (ref 26.5–34)
MCH RBC QN AUTO: 32.6 PG (ref 26.5–34)
MCH RBC QN AUTO: 32.6 PG (ref 26.5–34)
MCH RBC QN AUTO: 32.8 PG (ref 26.5–34)
MCH RBC QN AUTO: 32.9 PG (ref 26.5–34)
MCHC RBC AUTO-ENTMCNC: 33.9 G/DL (ref 31.5–36.3)
MCHC RBC AUTO-ENTMCNC: 34.7 G/DL (ref 31.5–36.3)
MCHC RBC AUTO-ENTMCNC: 35.2 G/DL (ref 31.5–36.3)
MCHC RBC AUTO-ENTMCNC: 35.2 G/DL (ref 31.5–36.3)
MCHC RBC AUTO-ENTMCNC: 35.6 G/DL (ref 31.5–36.3)
MCHC RBC AUTO-ENTMCNC: 35.9 G/DL (ref 31.5–36.3)
MCHC RBC AUTO-ENTMCNC: 36 G/DL (ref 31.5–36.3)
MCHC RBC AUTO-ENTMCNC: 36.2 G/DL (ref 31.5–36.3)
MCHC RBC AUTO-ENTMCNC: 36.3 G/DL (ref 31.5–36.3)
MCHC RBC AUTO-ENTMCNC: 36.4 G/DL (ref 31.5–36.3)
MCHC RBC AUTO-ENTMCNC: 37.2 G/DL (ref 31.5–36.3)
MCV RBC AUTO: 88.1 FL (ref 80–98)
MCV RBC AUTO: 89.1 FL (ref 80–98)
MCV RBC AUTO: 89.4 FL (ref 80–98)
MCV RBC AUTO: 90 FL (ref 80–98)
MCV RBC AUTO: 90.5 FL (ref 80–98)
MCV RBC AUTO: 90.6 FL (ref 80–98)
MCV RBC AUTO: 90.8 FL (ref 80–98)
MCV RBC AUTO: 91.5 FL (ref 80–98)
MCV RBC AUTO: 92.7 FL (ref 80–98)
MCV RBC AUTO: 92.7 FL (ref 80–98)
MCV RBC AUTO: 94.9 FL (ref 80–98)
MONOCYTES # BLD AUTO: 0.72 10*3/MM3 (ref 0–0.9)
MONOCYTES # BLD AUTO: 0.81 10*3/MM3 (ref 0–0.9)
MONOCYTES # BLD AUTO: 0.82 10*3/MM3 (ref 0–0.9)
MONOCYTES # BLD AUTO: 0.9 10*3/MM3 (ref 0–0.9)
MONOCYTES # BLD AUTO: 0.98 10*3/MM3 (ref 0–0.9)
MONOCYTES # BLD AUTO: 1 10*3/MM3 (ref 0–0.9)
MONOCYTES # BLD AUTO: 1.04 10*3/MM3 (ref 0–0.9)
MONOCYTES # BLD AUTO: 1.08 10*3/MM3 (ref 0–0.9)
MONOCYTES # BLD AUTO: 1.13 10*3/MM3 (ref 0–0.9)
MONOCYTES # BLD AUTO: 1.34 10*3/MM3 (ref 0–0.9)
MONOCYTES # BLD AUTO: 1.66 10*3/MM3 (ref 0–0.9)
MONOCYTES NFR BLD AUTO: 10.1 % (ref 0–12)
MONOCYTES NFR BLD AUTO: 10.7 % (ref 0–12)
MONOCYTES NFR BLD AUTO: 10.7 % (ref 0–12)
MONOCYTES NFR BLD AUTO: 10.8 % (ref 0–12)
MONOCYTES NFR BLD AUTO: 11.1 % (ref 0–12)
MONOCYTES NFR BLD AUTO: 11.3 % (ref 0–12)
MONOCYTES NFR BLD AUTO: 11.6 % (ref 0–12)
MONOCYTES NFR BLD AUTO: 12.7 % (ref 0–12)
MONOCYTES NFR BLD AUTO: 9.7 % (ref 0–12)
NEUTROPHILS # BLD AUTO: 13.74 10*3/MM3 (ref 2–8.6)
NEUTROPHILS # BLD AUTO: 4.04 10*3/MM3 (ref 2–8.6)
NEUTROPHILS # BLD AUTO: 5.1 10*3/MM3 (ref 2–8.6)
NEUTROPHILS # BLD AUTO: 5.61 10*3/MM3 (ref 2–8.6)
NEUTROPHILS # BLD AUTO: 5.7 10*3/MM3 (ref 2–8.6)
NEUTROPHILS # BLD AUTO: 5.81 10*3/MM3 (ref 2–8.6)
NEUTROPHILS # BLD AUTO: 5.98 10*3/MM3 (ref 2–8.6)
NEUTROPHILS # BLD AUTO: 6.77 10*3/MM3 (ref 2–8.6)
NEUTROPHILS # BLD AUTO: 6.86 10*3/MM3 (ref 2–8.6)
NEUTROPHILS # BLD AUTO: 7.78 10*3/MM3 (ref 2–8.6)
NEUTROPHILS # BLD AUTO: 8.8 10*3/MM3 (ref 2–8.6)
NEUTROPHILS NFR BLD AUTO: 63.5 % (ref 37–80)
NEUTROPHILS NFR BLD AUTO: 64.8 % (ref 37–80)
NEUTROPHILS NFR BLD AUTO: 65.2 % (ref 37–80)
NEUTROPHILS NFR BLD AUTO: 65.7 % (ref 37–80)
NEUTROPHILS NFR BLD AUTO: 67.9 % (ref 37–80)
NEUTROPHILS NFR BLD AUTO: 68.3 % (ref 37–80)
NEUTROPHILS NFR BLD AUTO: 68.8 % (ref 37–80)
NEUTROPHILS NFR BLD AUTO: 69.6 % (ref 37–80)
NEUTROPHILS NFR BLD AUTO: 73.9 % (ref 37–80)
NEUTROPHILS NFR BLD AUTO: 76 % (ref 37–80)
NEUTROPHILS NFR BLD AUTO: 80.1 % (ref 37–80)
NITRITE UR QL STRIP: NEGATIVE
NITRITE UR QL STRIP: NEGATIVE
NRBC BLD MANUAL-RTO: 0 /100 WBC (ref 0–0)
NT-PROBNP SERPL-MCNC: 2290 PG/ML (ref 0–900)
PH UR STRIP.AUTO: 5.5 [PH] (ref 5–9)
PH UR STRIP.AUTO: <=5 [PH] (ref 5–9)
PLATELET # BLD AUTO: 236 10*3/MM3 (ref 150–450)
PLATELET # BLD AUTO: 236 10*3/MM3 (ref 150–450)
PLATELET # BLD AUTO: 243 10*3/MM3 (ref 150–450)
PLATELET # BLD AUTO: 245 10*3/MM3 (ref 150–450)
PLATELET # BLD AUTO: 249 10*3/MM3 (ref 150–450)
PLATELET # BLD AUTO: 252 10*3/MM3 (ref 150–450)
PLATELET # BLD AUTO: 255 10*3/MM3 (ref 150–450)
PLATELET # BLD AUTO: 263 10*3/MM3 (ref 150–450)
PLATELET # BLD AUTO: 266 10*3/MM3 (ref 150–450)
PLATELET # BLD AUTO: 269 10*3/MM3 (ref 150–450)
PLATELET # BLD AUTO: 273 10*3/MM3 (ref 150–450)
PMV BLD AUTO: 10 FL (ref 8–12)
PMV BLD AUTO: 10.2 FL (ref 8–12)
PMV BLD AUTO: 10.3 FL (ref 8–12)
PMV BLD AUTO: 10.5 FL (ref 8–12)
PMV BLD AUTO: 10.8 FL (ref 8–12)
PMV BLD AUTO: 11 FL (ref 8–12)
PMV BLD AUTO: 9.5 FL (ref 8–12)
PMV BLD AUTO: 9.8 FL (ref 8–12)
PMV BLD AUTO: 9.8 FL (ref 8–12)
POTASSIUM BLD-SCNC: 4 MMOL/L (ref 3.5–5.1)
POTASSIUM BLD-SCNC: 4 MMOL/L (ref 3.5–5.1)
POTASSIUM BLD-SCNC: 4.1 MMOL/L (ref 3.5–5.1)
POTASSIUM BLD-SCNC: 4.2 MMOL/L (ref 3.5–5.1)
POTASSIUM BLD-SCNC: 4.4 MMOL/L (ref 3.5–5.1)
POTASSIUM BLD-SCNC: 4.7 MMOL/L (ref 3.5–5.1)
POTASSIUM BLD-SCNC: 4.8 MMOL/L (ref 3.5–5.1)
POTASSIUM BLD-SCNC: 5 MMOL/L (ref 3.5–5.1)
POTASSIUM BLD-SCNC: 5.4 MMOL/L (ref 3.5–5.1)
PROT SERPL-MCNC: 6.5 G/DL (ref 6.3–8.6)
PROT SERPL-MCNC: 7.2 G/DL (ref 6.3–8.6)
PROT SERPL-MCNC: 7.7 G/DL (ref 6.3–8.6)
PROT UR QL STRIP: ABNORMAL
PROT UR QL STRIP: ABNORMAL
RBC # BLD AUTO: 2.89 10*6/MM3 (ref 4.37–5.74)
RBC # BLD AUTO: 3.03 10*6/MM3 (ref 4.37–5.74)
RBC # BLD AUTO: 3.04 10*6/MM3 (ref 4.37–5.74)
RBC # BLD AUTO: 3.11 10*6/MM3 (ref 4.37–5.74)
RBC # BLD AUTO: 3.16 10*6/MM3 (ref 4.37–5.74)
RBC # BLD AUTO: 3.28 10*6/MM3 (ref 4.37–5.74)
RBC # BLD AUTO: 3.29 10*6/MM3 (ref 4.37–5.74)
RBC # BLD AUTO: 3.36 10*6/MM3 (ref 4.37–5.74)
RBC # BLD AUTO: 3.57 10*6/MM3 (ref 4.37–5.74)
RBC # BLD AUTO: 3.68 10*6/MM3 (ref 4.37–5.74)
RBC # BLD AUTO: 3.7 10*6/MM3 (ref 4.37–5.74)
RBC # UR: ABNORMAL /HPF
RBC # UR: NORMAL /HPF
REF LAB TEST METHOD: ABNORMAL
REF LAB TEST METHOD: NORMAL
S PNEUM AG SPEC QL LA: NEGATIVE
SODIUM BLD-SCNC: 130 MMOL/L (ref 137–145)
SODIUM BLD-SCNC: 132 MMOL/L (ref 137–145)
SODIUM BLD-SCNC: 132 MMOL/L (ref 137–145)
SODIUM BLD-SCNC: 133 MMOL/L (ref 137–145)
SODIUM BLD-SCNC: 134 MMOL/L (ref 137–145)
SODIUM BLD-SCNC: 135 MMOL/L (ref 137–145)
SP GR UR STRIP: 1.01 (ref 1–1.03)
SP GR UR STRIP: 1.02 (ref 1–1.03)
SQUAMOUS #/AREA URNS HPF: ABNORMAL /HPF
SQUAMOUS #/AREA URNS HPF: NORMAL /HPF
STRESS TARGET HR: 125 BPM
T4 FREE SERPL-MCNC: 1.29 NG/DL (ref 0.78–2.19)
TRIGL SERPL-MCNC: 184 MG/DL (ref 20–199)
TROPONIN I SERPL-MCNC: 0.12 NG/ML
TROPONIN I SERPL-MCNC: 0.12 NG/ML
TROPONIN I SERPL-MCNC: 0.13 NG/ML
TSH SERPL DL<=0.05 MIU/L-ACNC: 5.29 MIU/ML (ref 0.46–4.68)
TSH SERPL DL<=0.05 MIU/L-ACNC: 6.99 MIU/ML (ref 0.46–4.68)
UROBILINOGEN UR QL STRIP: ABNORMAL
UROBILINOGEN UR QL STRIP: ABNORMAL
WBC NRBC COR # BLD: 10.25 10*3/MM3 (ref 3.2–9.8)
WBC NRBC COR # BLD: 10.45 10*3/MM3 (ref 3.2–9.8)
WBC NRBC COR # BLD: 11.9 10*3/MM3 (ref 3.2–9.8)
WBC NRBC COR # BLD: 17.14 10*3/MM3 (ref 3.2–9.8)
WBC NRBC COR # BLD: 6.36 10*3/MM3 (ref 3.2–9.8)
WBC NRBC COR # BLD: 7.41 10*3/MM3 (ref 3.2–9.8)
WBC NRBC COR # BLD: 8.39 10*3/MM3 (ref 3.2–9.8)
WBC NRBC COR # BLD: 8.49 10*3/MM3 (ref 3.2–9.8)
WBC NRBC COR # BLD: 8.65 10*3/MM3 (ref 3.2–9.8)
WBC NRBC COR # BLD: 9.17 10*3/MM3 (ref 3.2–9.8)
WBC NRBC COR # BLD: 9.72 10*3/MM3 (ref 3.2–9.8)
WBC UR QL AUTO: ABNORMAL /HPF
WBC UR QL AUTO: NORMAL /HPF
WHOLE BLOOD HOLD SPECIMEN: NORMAL

## 2018-01-01 PROCEDURE — 25010000002 METOCLOPRAMIDE PER 10 MG: Performed by: INTERNAL MEDICINE

## 2018-01-01 PROCEDURE — 80053 COMPREHEN METABOLIC PANEL: CPT | Performed by: EMERGENCY MEDICINE

## 2018-01-01 PROCEDURE — 81001 URINALYSIS AUTO W/SCOPE: CPT | Performed by: EMERGENCY MEDICINE

## 2018-01-01 PROCEDURE — 84484 ASSAY OF TROPONIN QUANT: CPT | Performed by: HOSPITALIST

## 2018-01-01 PROCEDURE — 25010000002 LORAZEPAM PER 2 MG: Performed by: EMERGENCY MEDICINE

## 2018-01-01 PROCEDURE — 63710000001 INSULIN ASPART PER 5 UNITS: Performed by: HOSPITALIST

## 2018-01-01 PROCEDURE — 87205 SMEAR GRAM STAIN: CPT | Performed by: INTERNAL MEDICINE

## 2018-01-01 PROCEDURE — 25010000002 ENOXAPARIN PER 10 MG: Performed by: INTERNAL MEDICINE

## 2018-01-01 PROCEDURE — 85025 COMPLETE CBC W/AUTO DIFF WBC: CPT | Performed by: INTERNAL MEDICINE

## 2018-01-01 PROCEDURE — 94799 UNLISTED PULMONARY SVC/PX: CPT

## 2018-01-01 PROCEDURE — 87899 AGENT NOS ASSAY W/OPTIC: CPT | Performed by: INTERNAL MEDICINE

## 2018-01-01 PROCEDURE — 25010000002 PIPERACILLIN-TAZOBACTAM: Performed by: INTERNAL MEDICINE

## 2018-01-01 PROCEDURE — 83036 HEMOGLOBIN GLYCOSYLATED A1C: CPT

## 2018-01-01 PROCEDURE — 80048 BASIC METABOLIC PNL TOTAL CA: CPT | Performed by: INTERNAL MEDICINE

## 2018-01-01 PROCEDURE — 25010000002 ONDANSETRON PER 1 MG: Performed by: INTERNAL MEDICINE

## 2018-01-01 PROCEDURE — 82962 GLUCOSE BLOOD TEST: CPT

## 2018-01-01 PROCEDURE — 85025 COMPLETE CBC W/AUTO DIFF WBC: CPT

## 2018-01-01 PROCEDURE — 80061 LIPID PANEL: CPT

## 2018-01-01 PROCEDURE — 63710000001 INSULIN ASPART PER 5 UNITS: Performed by: INTERNAL MEDICINE

## 2018-01-01 PROCEDURE — 96375 TX/PRO/DX INJ NEW DRUG ADDON: CPT

## 2018-01-01 PROCEDURE — 96374 THER/PROPH/DIAG INJ IV PUSH: CPT

## 2018-01-01 PROCEDURE — 96376 TX/PRO/DX INJ SAME DRUG ADON: CPT

## 2018-01-01 PROCEDURE — 96372 THER/PROPH/DIAG INJ SC/IM: CPT

## 2018-01-01 PROCEDURE — 80053 COMPREHEN METABOLIC PANEL: CPT

## 2018-01-01 PROCEDURE — 25010000002 PIPERACILLIN SOD-TAZOBACTAM PER 1 G: Performed by: INTERNAL MEDICINE

## 2018-01-01 PROCEDURE — 36415 COLL VENOUS BLD VENIPUNCTURE: CPT

## 2018-01-01 PROCEDURE — 25010000002 HYDRALAZINE PER 20 MG: Performed by: INTERNAL MEDICINE

## 2018-01-01 PROCEDURE — 25010000002 HYDRALAZINE PER 20 MG: Performed by: EMERGENCY MEDICINE

## 2018-01-01 PROCEDURE — 63710000001 INSULIN DETEMIR PER 5 UNITS: Performed by: INTERNAL MEDICINE

## 2018-01-01 PROCEDURE — 25010000002 ONDANSETRON PER 1 MG: Performed by: EMERGENCY MEDICINE

## 2018-01-01 PROCEDURE — 63710000001 INSULIN DETEMIR PER 5 UNITS: Performed by: HOSPITALIST

## 2018-01-01 PROCEDURE — 87070 CULTURE OTHR SPECIMN AEROBIC: CPT | Performed by: INTERNAL MEDICINE

## 2018-01-01 PROCEDURE — 99214 OFFICE O/P EST MOD 30 MIN: CPT | Performed by: FAMILY MEDICINE

## 2018-01-01 PROCEDURE — G0378 HOSPITAL OBSERVATION PER HR: HCPCS

## 2018-01-01 PROCEDURE — 85025 COMPLETE CBC W/AUTO DIFF WBC: CPT | Performed by: EMERGENCY MEDICINE

## 2018-01-01 PROCEDURE — G0009 ADMIN PNEUMOCOCCAL VACCINE: HCPCS | Performed by: HOSPITALIST

## 2018-01-01 PROCEDURE — 93010 ELECTROCARDIOGRAM REPORT: CPT | Performed by: INTERNAL MEDICINE

## 2018-01-01 PROCEDURE — 87150 DNA/RNA AMPLIFIED PROBE: CPT | Performed by: EMERGENCY MEDICINE

## 2018-01-01 PROCEDURE — 84443 ASSAY THYROID STIM HORMONE: CPT

## 2018-01-01 PROCEDURE — 25010000002 AZITHROMYCIN: Performed by: EMERGENCY MEDICINE

## 2018-01-01 PROCEDURE — 82150 ASSAY OF AMYLASE: CPT | Performed by: EMERGENCY MEDICINE

## 2018-01-01 PROCEDURE — 83036 HEMOGLOBIN GLYCOSYLATED A1C: CPT | Performed by: INTERNAL MEDICINE

## 2018-01-01 PROCEDURE — 63710000001 INSULIN REGULAR HUMAN PER 5 UNITS: Performed by: EMERGENCY MEDICINE

## 2018-01-01 PROCEDURE — 25010000002 ONDANSETRON PER 1 MG

## 2018-01-01 PROCEDURE — 83690 ASSAY OF LIPASE: CPT | Performed by: EMERGENCY MEDICINE

## 2018-01-01 PROCEDURE — 84484 ASSAY OF TROPONIN QUANT: CPT | Performed by: EMERGENCY MEDICINE

## 2018-01-01 PROCEDURE — 93005 ELECTROCARDIOGRAM TRACING: CPT | Performed by: EMERGENCY MEDICINE

## 2018-01-01 PROCEDURE — 84484 ASSAY OF TROPONIN QUANT: CPT | Performed by: INTERNAL MEDICINE

## 2018-01-01 PROCEDURE — 93306 TTE W/DOPPLER COMPLETE: CPT

## 2018-01-01 PROCEDURE — 94760 N-INVAS EAR/PLS OXIMETRY 1: CPT

## 2018-01-01 PROCEDURE — 83880 ASSAY OF NATRIURETIC PEPTIDE: CPT | Performed by: EMERGENCY MEDICINE

## 2018-01-01 PROCEDURE — 71045 X-RAY EXAM CHEST 1 VIEW: CPT

## 2018-01-01 PROCEDURE — 99214 OFFICE O/P EST MOD 30 MIN: CPT | Performed by: NURSE PRACTITIONER

## 2018-01-01 PROCEDURE — 70450 CT HEAD/BRAIN W/O DYE: CPT

## 2018-01-01 PROCEDURE — 25010000002 CEFTRIAXONE: Performed by: EMERGENCY MEDICINE

## 2018-01-01 PROCEDURE — 93306 TTE W/DOPPLER COMPLETE: CPT | Performed by: INTERNAL MEDICINE

## 2018-01-01 PROCEDURE — 99285 EMERGENCY DEPT VISIT HI MDM: CPT

## 2018-01-01 PROCEDURE — 90732 PPSV23 VACC 2 YRS+ SUBQ/IM: CPT | Performed by: HOSPITALIST

## 2018-01-01 PROCEDURE — 25010000002 METOCLOPRAMIDE PER 10 MG: Performed by: EMERGENCY MEDICINE

## 2018-01-01 PROCEDURE — 74176 CT ABD & PELVIS W/O CONTRAST: CPT

## 2018-01-01 PROCEDURE — 84439 ASSAY OF FREE THYROXINE: CPT

## 2018-01-01 PROCEDURE — 99284 EMERGENCY DEPT VISIT MOD MDM: CPT

## 2018-01-01 PROCEDURE — 83605 ASSAY OF LACTIC ACID: CPT | Performed by: EMERGENCY MEDICINE

## 2018-01-01 PROCEDURE — 84443 ASSAY THYROID STIM HORMONE: CPT | Performed by: INTERNAL MEDICINE

## 2018-01-01 PROCEDURE — 25010000002 PNEUMOCOCCAL VAC POLYVALENT PER 0.5 ML: Performed by: HOSPITALIST

## 2018-01-01 PROCEDURE — 87040 BLOOD CULTURE FOR BACTERIA: CPT | Performed by: EMERGENCY MEDICINE

## 2018-01-01 RX ORDER — LEVOTHYROXINE SODIUM 0.05 MG/1
50 TABLET ORAL DAILY
COMMUNITY
End: 2018-01-01 | Stop reason: HOSPADM

## 2018-01-01 RX ORDER — PANTOPRAZOLE SODIUM 40 MG/1
40 TABLET, DELAYED RELEASE ORAL EVERY MORNING
Status: DISCONTINUED | OUTPATIENT
Start: 2018-01-01 | End: 2018-01-01 | Stop reason: HOSPADM

## 2018-01-01 RX ORDER — FLUTICASONE PROPIONATE 50 MCG
2 SPRAY, SUSPENSION (ML) NASAL DAILY
Status: DISCONTINUED | OUTPATIENT
Start: 2018-01-01 | End: 2018-01-01 | Stop reason: HOSPADM

## 2018-01-01 RX ORDER — LACTOBACILLUS RHAMNOSUS GG 10B CELL
1 CAPSULE ORAL 2 TIMES DAILY
Qty: 10 CAPSULE | Refills: 0 | Status: SHIPPED | OUTPATIENT
Start: 2018-01-01 | End: 2018-01-01

## 2018-01-01 RX ORDER — IPRATROPIUM BROMIDE AND ALBUTEROL SULFATE 2.5; .5 MG/3ML; MG/3ML
3 SOLUTION RESPIRATORY (INHALATION) EVERY 4 HOURS PRN
Status: DISCONTINUED | OUTPATIENT
Start: 2018-01-01 | End: 2018-01-01 | Stop reason: HOSPADM

## 2018-01-01 RX ORDER — SODIUM CHLORIDE 0.9 % (FLUSH) 0.9 %
1-10 SYRINGE (ML) INJECTION AS NEEDED
Status: DISCONTINUED | OUTPATIENT
Start: 2018-01-01 | End: 2018-01-01 | Stop reason: HOSPADM

## 2018-01-01 RX ORDER — CARVEDILOL 12.5 MG/1
12.5 TABLET ORAL EVERY 12 HOURS SCHEDULED
Status: DISCONTINUED | OUTPATIENT
Start: 2018-01-01 | End: 2018-01-01 | Stop reason: HOSPADM

## 2018-01-01 RX ORDER — DEXTROSE MONOHYDRATE 25 G/50ML
25 INJECTION, SOLUTION INTRAVENOUS
Status: DISCONTINUED | OUTPATIENT
Start: 2018-01-01 | End: 2018-01-01 | Stop reason: HOSPADM

## 2018-01-01 RX ORDER — SODIUM POLYSTYRENE SULFONATE 15 G/60ML
15 SUSPENSION ORAL; RECTAL ONCE
COMMUNITY
End: 2018-01-01 | Stop reason: HOSPADM

## 2018-01-01 RX ORDER — ONDANSETRON 2 MG/ML
4 INJECTION INTRAMUSCULAR; INTRAVENOUS ONCE
Status: COMPLETED | OUTPATIENT
Start: 2018-01-01 | End: 2018-01-01

## 2018-01-01 RX ORDER — BISACODYL 5 MG/1
5 TABLET, DELAYED RELEASE ORAL DAILY PRN
Status: DISCONTINUED | OUTPATIENT
Start: 2018-01-01 | End: 2018-01-01 | Stop reason: HOSPADM

## 2018-01-01 RX ORDER — METOCLOPRAMIDE HYDROCHLORIDE 5 MG/ML
10 INJECTION INTRAMUSCULAR; INTRAVENOUS ONCE
Status: COMPLETED | OUTPATIENT
Start: 2018-01-01 | End: 2018-01-01

## 2018-01-01 RX ORDER — BUDESONIDE AND FORMOTEROL FUMARATE DIHYDRATE 160; 4.5 UG/1; UG/1
2 AEROSOL RESPIRATORY (INHALATION)
Status: DISCONTINUED | OUTPATIENT
Start: 2018-01-01 | End: 2018-01-01 | Stop reason: HOSPADM

## 2018-01-01 RX ORDER — CLONIDINE HYDROCHLORIDE 0.2 MG/1
0.2 TABLET ORAL EVERY 8 HOURS SCHEDULED
Status: DISCONTINUED | OUTPATIENT
Start: 2018-01-01 | End: 2018-01-01

## 2018-01-01 RX ORDER — NITROGLYCERIN 0.4 MG/1
0.4 TABLET SUBLINGUAL
Status: DISCONTINUED | OUTPATIENT
Start: 2018-01-01 | End: 2018-01-01 | Stop reason: HOSPADM

## 2018-01-01 RX ORDER — CLOBETASOL PROPIONATE 0.46 MG/ML
1 SOLUTION TOPICAL DAILY
COMMUNITY

## 2018-01-01 RX ORDER — FENOFIBRATE 145 MG/1
145 TABLET, COATED ORAL DAILY
COMMUNITY
End: 2018-01-01 | Stop reason: HOSPADM

## 2018-01-01 RX ORDER — CHLORTHALIDONE 25 MG/1
12.5 TABLET ORAL DAILY
Qty: 30 TABLET | Refills: 0 | Status: SHIPPED | OUTPATIENT
Start: 2018-01-01

## 2018-01-01 RX ORDER — ASPIRIN 81 MG/1
81 TABLET ORAL DAILY
Status: DISCONTINUED | OUTPATIENT
Start: 2018-01-01 | End: 2018-01-01 | Stop reason: HOSPADM

## 2018-01-01 RX ORDER — ENALAPRILAT 2.5 MG/2ML
1.25 INJECTION INTRAVENOUS EVERY 6 HOURS PRN
Status: DISCONTINUED | OUTPATIENT
Start: 2018-01-01 | End: 2018-01-01 | Stop reason: HOSPADM

## 2018-01-01 RX ORDER — OXYCODONE AND ACETAMINOPHEN 10; 325 MG/1; MG/1
1 TABLET ORAL EVERY 6 HOURS PRN
COMMUNITY

## 2018-01-01 RX ORDER — ONDANSETRON 4 MG/1
4 TABLET, FILM COATED ORAL EVERY 6 HOURS PRN
Qty: 20 TABLET | Refills: 3 | Status: SHIPPED | OUTPATIENT
Start: 2018-01-01

## 2018-01-01 RX ORDER — CETIRIZINE HYDROCHLORIDE 5 MG/1
5 TABLET ORAL DAILY
Qty: 30 TABLET | Refills: 0 | Status: SHIPPED | OUTPATIENT
Start: 2018-01-01

## 2018-01-01 RX ORDER — ATORVASTATIN CALCIUM 40 MG/1
40 TABLET, FILM COATED ORAL NIGHTLY
Status: DISCONTINUED | OUTPATIENT
Start: 2018-01-01 | End: 2018-01-01 | Stop reason: HOSPADM

## 2018-01-01 RX ORDER — ESCITALOPRAM OXALATE 10 MG/1
1 TABLET ORAL DAILY
COMMUNITY

## 2018-01-01 RX ORDER — HYDRALAZINE HYDROCHLORIDE 20 MG/ML
10 INJECTION INTRAMUSCULAR; INTRAVENOUS EVERY 6 HOURS PRN
Status: DISCONTINUED | OUTPATIENT
Start: 2018-01-01 | End: 2018-01-01 | Stop reason: HOSPADM

## 2018-01-01 RX ORDER — CHLORTHALIDONE 25 MG/1
12.5 TABLET ORAL DAILY
Status: DISCONTINUED | OUTPATIENT
Start: 2018-01-01 | End: 2018-01-01 | Stop reason: HOSPADM

## 2018-01-01 RX ORDER — DOCUSATE SODIUM 100 MG/1
100 CAPSULE, LIQUID FILLED ORAL 2 TIMES DAILY PRN
Status: DISCONTINUED | OUTPATIENT
Start: 2018-01-01 | End: 2018-01-01 | Stop reason: ALTCHOICE

## 2018-01-01 RX ORDER — ONDANSETRON 4 MG/1
4 TABLET, FILM COATED ORAL EVERY 6 HOURS PRN
Status: DISCONTINUED | OUTPATIENT
Start: 2018-01-01 | End: 2018-01-01 | Stop reason: HOSPADM

## 2018-01-01 RX ORDER — CETIRIZINE HYDROCHLORIDE 5 MG/1
5 TABLET ORAL NIGHTLY
Status: DISCONTINUED | OUTPATIENT
Start: 2018-01-01 | End: 2018-01-01 | Stop reason: HOSPADM

## 2018-01-01 RX ORDER — LABETALOL HYDROCHLORIDE 5 MG/ML
20 INJECTION, SOLUTION INTRAVENOUS ONCE
Status: COMPLETED | OUTPATIENT
Start: 2018-01-01 | End: 2018-01-01

## 2018-01-01 RX ORDER — ASPIRIN 81 MG/1
81 TABLET ORAL DAILY
Qty: 30 TABLET | Refills: 0 | Status: SHIPPED | OUTPATIENT
Start: 2018-01-01

## 2018-01-01 RX ORDER — HYDRALAZINE HYDROCHLORIDE 50 MG/1
50 TABLET, FILM COATED ORAL EVERY 8 HOURS SCHEDULED
Status: DISCONTINUED | OUTPATIENT
Start: 2018-01-01 | End: 2018-01-01 | Stop reason: HOSPADM

## 2018-01-01 RX ORDER — ONDANSETRON 2 MG/ML
INJECTION INTRAMUSCULAR; INTRAVENOUS
Status: COMPLETED
Start: 2018-01-01 | End: 2018-01-01

## 2018-01-01 RX ORDER — AMLODIPINE BESYLATE 10 MG/1
10 TABLET ORAL DAILY
Status: DISCONTINUED | OUTPATIENT
Start: 2018-01-01 | End: 2018-01-01 | Stop reason: HOSPADM

## 2018-01-01 RX ORDER — LISINOPRIL 40 MG/1
40 TABLET ORAL DAILY
COMMUNITY
End: 2018-01-01 | Stop reason: HOSPADM

## 2018-01-01 RX ORDER — ONDANSETRON 4 MG/1
4 TABLET, FILM COATED ORAL EVERY 6 HOURS PRN
Qty: 10 TABLET | Refills: 0 | Status: SHIPPED | OUTPATIENT
Start: 2018-01-01 | End: 2018-01-01 | Stop reason: SDUPTHER

## 2018-01-01 RX ORDER — POTASSIUM CHLORIDE 750 MG/1
1 TABLET, EXTENDED RELEASE ORAL DAILY
COMMUNITY

## 2018-01-01 RX ORDER — CARVEDILOL 6.25 MG/1
6.25 TABLET ORAL EVERY 12 HOURS SCHEDULED
Status: DISCONTINUED | OUTPATIENT
Start: 2018-01-01 | End: 2018-01-01

## 2018-01-01 RX ORDER — CARVEDILOL 6.25 MG/1
6.25 TABLET ORAL EVERY 12 HOURS SCHEDULED
Qty: 60 TABLET | Refills: 0 | Status: SHIPPED | OUTPATIENT
Start: 2018-01-01 | End: 2018-01-01 | Stop reason: HOSPADM

## 2018-01-01 RX ORDER — CITALOPRAM 40 MG/1
40 TABLET ORAL DAILY
COMMUNITY
End: 2018-01-01 | Stop reason: DRUGHIGH

## 2018-01-01 RX ORDER — NICOTINE POLACRILEX 4 MG
15 LOZENGE BUCCAL
Status: DISCONTINUED | OUTPATIENT
Start: 2018-01-01 | End: 2018-01-01 | Stop reason: HOSPADM

## 2018-01-01 RX ORDER — METOPROLOL TARTRATE 50 MG/1
50 TABLET, FILM COATED ORAL EVERY 12 HOURS SCHEDULED
Status: DISCONTINUED | OUTPATIENT
Start: 2018-01-01 | End: 2018-01-01

## 2018-01-01 RX ORDER — LISINOPRIL 5 MG/1
1 TABLET ORAL DAILY
COMMUNITY

## 2018-01-01 RX ORDER — HYDROMORPHONE HYDROCHLORIDE 2 MG/1
4 TABLET ORAL EVERY 4 HOURS PRN
Status: DISCONTINUED | OUTPATIENT
Start: 2018-01-01 | End: 2018-01-01

## 2018-01-01 RX ORDER — LEVOTHYROXINE SODIUM 0.03 MG/1
25 TABLET ORAL
Status: DISCONTINUED | OUTPATIENT
Start: 2018-01-01 | End: 2018-01-01 | Stop reason: HOSPADM

## 2018-01-01 RX ORDER — SODIUM CHLORIDE 9 MG/ML
50 INJECTION, SOLUTION INTRAVENOUS CONTINUOUS
Status: DISCONTINUED | OUTPATIENT
Start: 2018-01-01 | End: 2018-01-01

## 2018-01-01 RX ORDER — TAMSULOSIN HYDROCHLORIDE 0.4 MG/1
0.4 CAPSULE ORAL DAILY
Status: DISCONTINUED | OUTPATIENT
Start: 2018-01-01 | End: 2018-01-01 | Stop reason: HOSPADM

## 2018-01-01 RX ORDER — LABETALOL HYDROCHLORIDE 5 MG/ML
40 INJECTION, SOLUTION INTRAVENOUS ONCE
Status: COMPLETED | OUTPATIENT
Start: 2018-01-01 | End: 2018-01-01

## 2018-01-01 RX ORDER — DICYCLOMINE HCL 20 MG
1 TABLET ORAL
Refills: 0 | COMMUNITY
Start: 2018-01-01

## 2018-01-01 RX ORDER — CARVEDILOL 6.25 MG/1
TABLET ORAL
COMMUNITY

## 2018-01-01 RX ORDER — HYDROMORPHONE HYDROCHLORIDE 4 MG/1
4 TABLET ORAL EVERY 4 HOURS PRN
Status: ON HOLD | COMMUNITY
End: 2018-01-01

## 2018-01-01 RX ORDER — IPRATROPIUM BROMIDE AND ALBUTEROL SULFATE 2.5; .5 MG/3ML; MG/3ML
3 SOLUTION RESPIRATORY (INHALATION)
Status: DISCONTINUED | OUTPATIENT
Start: 2018-01-01 | End: 2018-01-01

## 2018-01-01 RX ORDER — LISINOPRIL 40 MG/1
40 TABLET ORAL DAILY
Status: DISCONTINUED | OUTPATIENT
Start: 2018-01-01 | End: 2018-01-01

## 2018-01-01 RX ORDER — SODIUM CHLORIDE 9 MG/ML
INJECTION, SOLUTION INTRAVENOUS
Status: COMPLETED
Start: 2018-01-01 | End: 2018-01-01

## 2018-01-01 RX ORDER — HYDRALAZINE HYDROCHLORIDE 25 MG/1
25 TABLET, FILM COATED ORAL EVERY 8 HOURS SCHEDULED
Status: DISCONTINUED | OUTPATIENT
Start: 2018-01-01 | End: 2018-01-01

## 2018-01-01 RX ORDER — ATORVASTATIN CALCIUM 40 MG/1
40 TABLET, FILM COATED ORAL DAILY
Status: DISCONTINUED | OUTPATIENT
Start: 2018-01-01 | End: 2018-01-01 | Stop reason: HOSPADM

## 2018-01-01 RX ORDER — HYDROMORPHONE HYDROCHLORIDE 4 MG/1
4 TABLET ORAL EVERY 4 HOURS PRN
Status: DISCONTINUED | OUTPATIENT
Start: 2018-01-01 | End: 2018-01-01

## 2018-01-01 RX ORDER — SENNA AND DOCUSATE SODIUM 50; 8.6 MG/1; MG/1
2 TABLET, FILM COATED ORAL 2 TIMES DAILY PRN
Status: DISCONTINUED | OUTPATIENT
Start: 2018-01-01 | End: 2018-01-01 | Stop reason: HOSPADM

## 2018-01-01 RX ORDER — CITALOPRAM 40 MG/1
40 TABLET ORAL DAILY
Status: DISCONTINUED | OUTPATIENT
Start: 2018-01-01 | End: 2018-01-01 | Stop reason: HOSPADM

## 2018-01-01 RX ORDER — FLUTICASONE PROPIONATE 50 MCG
2 SPRAY, SUSPENSION (ML) NASAL NIGHTLY
Status: DISCONTINUED | OUTPATIENT
Start: 2018-01-01 | End: 2018-01-01 | Stop reason: HOSPADM

## 2018-01-01 RX ORDER — METOCLOPRAMIDE HYDROCHLORIDE 5 MG/ML
5 INJECTION INTRAMUSCULAR; INTRAVENOUS
Status: DISCONTINUED | OUTPATIENT
Start: 2018-01-01 | End: 2018-01-01

## 2018-01-01 RX ORDER — HYDRALAZINE HYDROCHLORIDE 50 MG/1
50 TABLET, FILM COATED ORAL EVERY 8 HOURS SCHEDULED
Status: DISCONTINUED | OUTPATIENT
Start: 2018-01-01 | End: 2018-01-01

## 2018-01-01 RX ORDER — HYDRALAZINE HYDROCHLORIDE 50 MG/1
100 TABLET, FILM COATED ORAL EVERY 8 HOURS SCHEDULED
Status: DISCONTINUED | OUTPATIENT
Start: 2018-01-01 | End: 2018-01-01 | Stop reason: HOSPADM

## 2018-01-01 RX ORDER — HYDRALAZINE HYDROCHLORIDE 100 MG/1
100 TABLET, FILM COATED ORAL EVERY 8 HOURS
COMMUNITY

## 2018-01-01 RX ORDER — CLONIDINE HYDROCHLORIDE 0.2 MG/1
0.2 TABLET ORAL EVERY 8 HOURS SCHEDULED
Status: DISCONTINUED | OUTPATIENT
Start: 2018-01-01 | End: 2018-01-01 | Stop reason: HOSPADM

## 2018-01-01 RX ORDER — LEVOTHYROXINE SODIUM 0.03 MG/1
25 TABLET ORAL DAILY
Status: DISCONTINUED | OUTPATIENT
Start: 2018-01-01 | End: 2018-01-01 | Stop reason: HOSPADM

## 2018-01-01 RX ORDER — LEVOTHYROXINE SODIUM 0.03 MG/1
25 TABLET ORAL DAILY
Qty: 30 TABLET | Refills: 0 | Status: SHIPPED | OUTPATIENT
Start: 2018-01-01

## 2018-01-01 RX ORDER — BACLOFEN 10 MG/1
5-10 TABLET ORAL 3 TIMES DAILY PRN
COMMUNITY

## 2018-01-01 RX ORDER — HYDRALAZINE HYDROCHLORIDE 20 MG/ML
20 INJECTION INTRAMUSCULAR; INTRAVENOUS ONCE
Status: COMPLETED | OUTPATIENT
Start: 2018-01-01 | End: 2018-01-01

## 2018-01-01 RX ORDER — OXYCODONE AND ACETAMINOPHEN 10; 325 MG/1; MG/1
1 TABLET ORAL EVERY 4 HOURS PRN
Status: DISCONTINUED | OUTPATIENT
Start: 2018-01-01 | End: 2018-01-01 | Stop reason: HOSPADM

## 2018-01-01 RX ORDER — AMOXICILLIN AND CLAVULANATE POTASSIUM 500; 125 MG/1; MG/1
1 TABLET, FILM COATED ORAL 2 TIMES DAILY
Qty: 10 TABLET | Refills: 0 | Status: SHIPPED | OUTPATIENT
Start: 2018-01-01 | End: 2018-01-01

## 2018-01-01 RX ORDER — LORAZEPAM 1 MG/1
1 TABLET ORAL EVERY 6 HOURS PRN
Status: DISCONTINUED | OUTPATIENT
Start: 2018-01-01 | End: 2018-01-01 | Stop reason: HOSPADM

## 2018-01-01 RX ORDER — HYDRALAZINE HYDROCHLORIDE 50 MG/1
50 TABLET, FILM COATED ORAL EVERY 8 HOURS SCHEDULED
Qty: 90 TABLET | Refills: 0 | Status: ON HOLD | OUTPATIENT
Start: 2018-01-01 | End: 2018-01-01

## 2018-01-01 RX ORDER — LABETALOL HYDROCHLORIDE 5 MG/ML
80 INJECTION, SOLUTION INTRAVENOUS ONCE
Status: DISCONTINUED | OUTPATIENT
Start: 2018-01-01 | End: 2018-01-01

## 2018-01-01 RX ORDER — ONDANSETRON 2 MG/ML
4 INJECTION INTRAMUSCULAR; INTRAVENOUS EVERY 6 HOURS PRN
Status: DISCONTINUED | OUTPATIENT
Start: 2018-01-01 | End: 2018-01-01 | Stop reason: HOSPADM

## 2018-01-01 RX ORDER — MINOXIDIL 2.5 MG/1
1 TABLET ORAL DAILY
COMMUNITY

## 2018-01-01 RX ORDER — CARVEDILOL 3.12 MG/1
3.12 TABLET ORAL EVERY 12 HOURS SCHEDULED
Status: DISCONTINUED | OUTPATIENT
Start: 2018-01-01 | End: 2018-01-01

## 2018-01-01 RX ORDER — LORAZEPAM 2 MG/ML
0.5 INJECTION INTRAMUSCULAR ONCE
Status: COMPLETED | OUTPATIENT
Start: 2018-01-01 | End: 2018-01-01

## 2018-01-01 RX ORDER — CETIRIZINE HYDROCHLORIDE 5 MG/1
5 TABLET ORAL DAILY
Status: DISCONTINUED | OUTPATIENT
Start: 2018-01-01 | End: 2018-01-01 | Stop reason: HOSPADM

## 2018-01-01 RX ORDER — OXYCODONE HYDROCHLORIDE AND ACETAMINOPHEN 5; 325 MG/1; MG/1
1 TABLET ORAL EVERY 4 HOURS PRN
Status: DISCONTINUED | OUTPATIENT
Start: 2018-01-01 | End: 2018-01-01 | Stop reason: HOSPADM

## 2018-01-01 RX ORDER — ASPIRIN 81 MG/1
324 TABLET, CHEWABLE ORAL ONCE
Status: COMPLETED | OUTPATIENT
Start: 2018-01-01 | End: 2018-01-01

## 2018-01-01 RX ORDER — CALCIUM CARBONATE 200(500)MG
1 TABLET,CHEWABLE ORAL 2 TIMES DAILY PRN
Status: DISCONTINUED | OUTPATIENT
Start: 2018-01-01 | End: 2018-01-01 | Stop reason: HOSPADM

## 2018-01-01 RX ORDER — ONDANSETRON 4 MG/1
4 TABLET, ORALLY DISINTEGRATING ORAL EVERY 6 HOURS PRN
Status: DISCONTINUED | OUTPATIENT
Start: 2018-01-01 | End: 2018-01-01 | Stop reason: HOSPADM

## 2018-01-01 RX ORDER — CLONIDINE HYDROCHLORIDE 0.3 MG/1
0.3 TABLET ORAL 3 TIMES DAILY
COMMUNITY

## 2018-01-01 RX ORDER — ESOMEPRAZOLE MAGNESIUM 40 MG/1
40 CAPSULE, DELAYED RELEASE ORAL 2 TIMES DAILY
COMMUNITY

## 2018-01-01 RX ORDER — FENOFIBRATE 145 MG/1
145 TABLET, COATED ORAL DAILY
Status: DISCONTINUED | OUTPATIENT
Start: 2018-01-01 | End: 2018-01-01

## 2018-01-01 RX ORDER — BUDESONIDE AND FORMOTEROL FUMARATE DIHYDRATE 160; 4.5 UG/1; UG/1
2 AEROSOL RESPIRATORY (INHALATION)
COMMUNITY

## 2018-01-01 RX ORDER — ACETAMINOPHEN 325 MG/1
650 TABLET ORAL EVERY 6 HOURS PRN
Status: DISCONTINUED | OUTPATIENT
Start: 2018-01-01 | End: 2018-01-01 | Stop reason: HOSPADM

## 2018-01-01 RX ORDER — CITALOPRAM 20 MG/1
20 TABLET ORAL DAILY
Status: DISCONTINUED | OUTPATIENT
Start: 2018-01-01 | End: 2018-01-01 | Stop reason: HOSPADM

## 2018-01-01 RX ORDER — CLONIDINE HYDROCHLORIDE 0.2 MG/1
0.2 TABLET ORAL EVERY 8 HOURS SCHEDULED
Qty: 90 TABLET | Refills: 0 | Status: SHIPPED | OUTPATIENT
Start: 2018-01-01 | End: 2018-01-01

## 2018-01-01 RX ORDER — SODIUM CHLORIDE 0.9 % (FLUSH) 0.9 %
10 SYRINGE (ML) INJECTION AS NEEDED
Status: DISCONTINUED | OUTPATIENT
Start: 2018-01-01 | End: 2018-01-01 | Stop reason: HOSPADM

## 2018-01-01 RX ADMIN — INSULIN ASPART 8 UNITS: 100 INJECTION, SOLUTION INTRAVENOUS; SUBCUTANEOUS at 11:39

## 2018-01-01 RX ADMIN — HYDRALAZINE HYDROCHLORIDE 50 MG: 50 TABLET, FILM COATED ORAL at 15:21

## 2018-01-01 RX ADMIN — HYDRALAZINE HYDROCHLORIDE 100 MG: 50 TABLET ORAL at 08:22

## 2018-01-01 RX ADMIN — ATORVASTATIN CALCIUM 40 MG: 40 TABLET, FILM COATED ORAL at 15:39

## 2018-01-01 RX ADMIN — CLONIDINE HYDROCHLORIDE 0.3 MG: 0.2 TABLET ORAL at 21:15

## 2018-01-01 RX ADMIN — ENOXAPARIN SODIUM 30 MG: 30 INJECTION SUBCUTANEOUS at 17:44

## 2018-01-01 RX ADMIN — CLONIDINE HYDROCHLORIDE 0.2 MG: 0.2 TABLET ORAL at 21:05

## 2018-01-01 RX ADMIN — AZITHROMYCIN 500 MG: 500 INJECTION, POWDER, LYOPHILIZED, FOR SOLUTION INTRAVENOUS at 10:57

## 2018-01-01 RX ADMIN — HYDRALAZINE HYDROCHLORIDE 100 MG: 50 TABLET ORAL at 00:02

## 2018-01-01 RX ADMIN — INSULIN ASPART 8 UNITS: 100 INJECTION, SOLUTION INTRAVENOUS; SUBCUTANEOUS at 11:31

## 2018-01-01 RX ADMIN — LEVOTHYROXINE SODIUM 25 MCG: 25 TABLET ORAL at 08:48

## 2018-01-01 RX ADMIN — CLONIDINE HYDROCHLORIDE 0.3 MG: 0.2 TABLET ORAL at 20:37

## 2018-01-01 RX ADMIN — CARVEDILOL 6.25 MG: 6.25 TABLET, FILM COATED ORAL at 12:17

## 2018-01-01 RX ADMIN — CLONIDINE HYDROCHLORIDE 0.3 MG: 0.2 TABLET ORAL at 03:49

## 2018-01-01 RX ADMIN — Medication 10 MG: at 05:32

## 2018-01-01 RX ADMIN — HYDRALAZINE HYDROCHLORIDE 100 MG: 50 TABLET ORAL at 21:15

## 2018-01-01 RX ADMIN — LORAZEPAM 1 MG: 1 TABLET ORAL at 10:48

## 2018-01-01 RX ADMIN — Medication 12.5 MG: at 17:59

## 2018-01-01 RX ADMIN — FLUTICASONE PROPIONATE 2 SPRAY: 50 SPRAY, METERED NASAL at 21:49

## 2018-01-01 RX ADMIN — PANTOPRAZOLE SODIUM 40 MG: 40 TABLET, DELAYED RELEASE ORAL at 06:43

## 2018-01-01 RX ADMIN — ONDANSETRON HYDROCHLORIDE 4 MG: 2 INJECTION INTRAMUSCULAR; INTRAVENOUS at 08:58

## 2018-01-01 RX ADMIN — HYDRALAZINE HYDROCHLORIDE 100 MG: 50 TABLET ORAL at 08:34

## 2018-01-01 RX ADMIN — INSULIN ASPART 10 UNITS: 100 INJECTION, SOLUTION INTRAVENOUS; SUBCUTANEOUS at 16:48

## 2018-01-01 RX ADMIN — PANTOPRAZOLE SODIUM 40 MG: 40 TABLET, DELAYED RELEASE ORAL at 05:32

## 2018-01-01 RX ADMIN — PIPERACILLIN SODIUM,TAZOBACTAM SODIUM 3.38 G: 3; .375 INJECTION, POWDER, FOR SOLUTION INTRAVENOUS at 17:11

## 2018-01-01 RX ADMIN — METOCLOPRAMIDE 5 MG: 5 INJECTION, SOLUTION INTRAMUSCULAR; INTRAVENOUS at 17:43

## 2018-01-01 RX ADMIN — INSULIN ASPART 10 UNITS: 100 INJECTION, SOLUTION INTRAVENOUS; SUBCUTANEOUS at 18:00

## 2018-01-01 RX ADMIN — CARVEDILOL 6.25 MG: 6.25 TABLET, FILM COATED ORAL at 21:43

## 2018-01-01 RX ADMIN — AMLODIPINE BESYLATE 10 MG: 10 TABLET ORAL at 08:34

## 2018-01-01 RX ADMIN — PIPERACILLIN SODIUM,TAZOBACTAM SODIUM 3.38 G: 3; .375 INJECTION, POWDER, FOR SOLUTION INTRAVENOUS at 01:32

## 2018-01-01 RX ADMIN — CITALOPRAM HYDROBROMIDE 20 MG: 20 TABLET ORAL at 08:33

## 2018-01-01 RX ADMIN — INSULIN ASPART 8 UNITS: 100 INJECTION, SOLUTION INTRAVENOUS; SUBCUTANEOUS at 11:36

## 2018-01-01 RX ADMIN — HYDRALAZINE HYDROCHLORIDE 100 MG: 50 TABLET ORAL at 14:14

## 2018-01-01 RX ADMIN — ONDANSETRON 4 MG: 2 SOLUTION INTRAMUSCULAR; INTRAVENOUS at 21:22

## 2018-01-01 RX ADMIN — CARVEDILOL 6.25 MG: 6.25 TABLET, FILM COATED ORAL at 08:34

## 2018-01-01 RX ADMIN — METOCLOPRAMIDE 5 MG: 5 INJECTION, SOLUTION INTRAMUSCULAR; INTRAVENOUS at 06:49

## 2018-01-01 RX ADMIN — HYDRALAZINE HYDROCHLORIDE 100 MG: 50 TABLET ORAL at 14:11

## 2018-01-01 RX ADMIN — CLONIDINE HYDROCHLORIDE 0.2 MG: 0.2 TABLET ORAL at 06:03

## 2018-01-01 RX ADMIN — INSULIN ASPART 10 UNITS: 100 INJECTION, SOLUTION INTRAVENOUS; SUBCUTANEOUS at 12:17

## 2018-01-01 RX ADMIN — INSULIN ASPART 4 UNITS: 100 INJECTION, SOLUTION INTRAVENOUS; SUBCUTANEOUS at 20:29

## 2018-01-01 RX ADMIN — TAMSULOSIN HYDROCHLORIDE 0.4 MG: 0.4 CAPSULE ORAL at 09:15

## 2018-01-01 RX ADMIN — PIPERACILLIN SODIUM,TAZOBACTAM SODIUM 3.38 G: 3; .375 INJECTION, POWDER, FOR SOLUTION INTRAVENOUS at 08:51

## 2018-01-01 RX ADMIN — LEVOTHYROXINE SODIUM 25 MCG: 25 TABLET ORAL at 05:28

## 2018-01-01 RX ADMIN — PIPERACILLIN SODIUM,TAZOBACTAM SODIUM 3.38 G: 3; .375 INJECTION, POWDER, FOR SOLUTION INTRAVENOUS at 01:44

## 2018-01-01 RX ADMIN — AMLODIPINE BESYLATE 10 MG: 10 TABLET ORAL at 08:33

## 2018-01-01 RX ADMIN — PIPERACILLIN SODIUM,TAZOBACTAM SODIUM 3.38 G: 3; .375 INJECTION, POWDER, FOR SOLUTION INTRAVENOUS at 10:44

## 2018-01-01 RX ADMIN — OXYCODONE HYDROCHLORIDE AND ACETAMINOPHEN 1 TABLET: 5; 325 TABLET ORAL at 21:08

## 2018-01-01 RX ADMIN — OXYCODONE HYDROCHLORIDE AND ACETAMINOPHEN 1 TABLET: 5; 325 TABLET ORAL at 18:48

## 2018-01-01 RX ADMIN — PIPERACILLIN SODIUM,TAZOBACTAM SODIUM 3.38 G: 3; .375 INJECTION, POWDER, FOR SOLUTION INTRAVENOUS at 17:43

## 2018-01-01 RX ADMIN — HYDRALAZINE HYDROCHLORIDE 25 MG: 25 TABLET, FILM COATED ORAL at 05:38

## 2018-01-01 RX ADMIN — CETIRIZINE HYDROCHLORIDE 5 MG: 5 TABLET, FILM COATED ORAL at 21:43

## 2018-01-01 RX ADMIN — INSULIN DETEMIR 30 UNITS: 100 INJECTION, SOLUTION SUBCUTANEOUS at 21:26

## 2018-01-01 RX ADMIN — CARVEDILOL 3.12 MG: 3.12 TABLET, FILM COATED ORAL at 21:15

## 2018-01-01 RX ADMIN — TAMSULOSIN HYDROCHLORIDE 0.4 MG: 0.4 CAPSULE ORAL at 09:24

## 2018-01-01 RX ADMIN — CETIRIZINE HYDROCHLORIDE 5 MG: 5 TABLET, FILM COATED ORAL at 08:48

## 2018-01-01 RX ADMIN — OXYCODONE HYDROCHLORIDE AND ACETAMINOPHEN 1 TABLET: 5; 325 TABLET ORAL at 21:44

## 2018-01-01 RX ADMIN — OXYCODONE HYDROCHLORIDE AND ACETAMINOPHEN 1 TABLET: 5; 325 TABLET ORAL at 06:14

## 2018-01-01 RX ADMIN — CARVEDILOL 6.25 MG: 6.25 TABLET, FILM COATED ORAL at 09:16

## 2018-01-01 RX ADMIN — ONDANSETRON HYDROCHLORIDE 4 MG: 2 INJECTION, SOLUTION INTRAMUSCULAR; INTRAVENOUS at 08:56

## 2018-01-01 RX ADMIN — PIPERACILLIN SODIUM,TAZOBACTAM SODIUM 3.38 G: 3; .375 INJECTION, POWDER, FOR SOLUTION INTRAVENOUS at 16:49

## 2018-01-01 RX ADMIN — CITALOPRAM HYDROBROMIDE 40 MG: 40 TABLET ORAL at 08:34

## 2018-01-01 RX ADMIN — PANTOPRAZOLE SODIUM 40 MG: 40 TABLET, DELAYED RELEASE ORAL at 06:11

## 2018-01-01 RX ADMIN — OXYCODONE HYDROCHLORIDE AND ACETAMINOPHEN 1 TABLET: 10; 325 TABLET ORAL at 21:04

## 2018-01-01 RX ADMIN — HUMAN INSULIN 4 UNITS: 100 INJECTION, SOLUTION SUBCUTANEOUS at 10:15

## 2018-01-01 RX ADMIN — PIPERACILLIN SODIUM,TAZOBACTAM SODIUM 3.38 G: 3; .375 INJECTION, POWDER, FOR SOLUTION INTRAVENOUS at 02:03

## 2018-01-01 RX ADMIN — TAMSULOSIN HYDROCHLORIDE 0.4 MG: 0.4 CAPSULE ORAL at 08:22

## 2018-01-01 RX ADMIN — ACETAMINOPHEN 650 MG: 325 TABLET ORAL at 07:50

## 2018-01-01 RX ADMIN — HYDRALAZINE HYDROCHLORIDE 50 MG: 50 TABLET, FILM COATED ORAL at 17:43

## 2018-01-01 RX ADMIN — TAMSULOSIN HYDROCHLORIDE 0.4 MG: 0.4 CAPSULE ORAL at 08:34

## 2018-01-01 RX ADMIN — HYDRALAZINE HYDROCHLORIDE 100 MG: 50 TABLET ORAL at 21:09

## 2018-01-01 RX ADMIN — ONDANSETRON 4 MG: 2 SOLUTION INTRAMUSCULAR; INTRAVENOUS at 10:41

## 2018-01-01 RX ADMIN — LORAZEPAM 1 MG: 1 TABLET ORAL at 21:01

## 2018-01-01 RX ADMIN — ATORVASTATIN CALCIUM 40 MG: 40 TABLET, FILM COATED ORAL at 09:24

## 2018-01-01 RX ADMIN — PANTOPRAZOLE SODIUM 40 MG: 40 TABLET, DELAYED RELEASE ORAL at 06:13

## 2018-01-01 RX ADMIN — FENOFIBRATE 145 MG: 145 TABLET ORAL at 09:24

## 2018-01-01 RX ADMIN — HYDRALAZINE HYDROCHLORIDE 100 MG: 50 TABLET ORAL at 08:54

## 2018-01-01 RX ADMIN — SODIUM CHLORIDE 50 ML/HR: 9 INJECTION, SOLUTION INTRAVENOUS at 20:25

## 2018-01-01 RX ADMIN — TAMSULOSIN HYDROCHLORIDE 0.4 MG: 0.4 CAPSULE ORAL at 15:39

## 2018-01-01 RX ADMIN — INSULIN ASPART 12 UNITS: 100 INJECTION, SOLUTION INTRAVENOUS; SUBCUTANEOUS at 12:52

## 2018-01-01 RX ADMIN — AMLODIPINE BESYLATE 10 MG: 10 TABLET ORAL at 08:54

## 2018-01-01 RX ADMIN — AMLODIPINE BESYLATE 10 MG: 10 TABLET ORAL at 15:39

## 2018-01-01 RX ADMIN — CLONIDINE HYDROCHLORIDE 0.3 MG: 0.2 TABLET ORAL at 06:33

## 2018-01-01 RX ADMIN — CITALOPRAM HYDROBROMIDE 20 MG: 20 TABLET ORAL at 08:48

## 2018-01-01 RX ADMIN — CLONIDINE HYDROCHLORIDE 0.3 MG: 0.2 TABLET ORAL at 11:31

## 2018-01-01 RX ADMIN — FLUTICASONE PROPIONATE 2 SPRAY: 50 SPRAY, METERED NASAL at 20:39

## 2018-01-01 RX ADMIN — CARVEDILOL 6.25 MG: 6.25 TABLET, FILM COATED ORAL at 21:09

## 2018-01-01 RX ADMIN — LABETALOL HYDROCHLORIDE 20 MG: 5 INJECTION, SOLUTION INTRAVENOUS at 17:28

## 2018-01-01 RX ADMIN — CLONIDINE HYDROCHLORIDE 0.3 MG: 0.2 TABLET ORAL at 21:09

## 2018-01-01 RX ADMIN — INSULIN ASPART 10 UNITS: 100 INJECTION, SOLUTION INTRAVENOUS; SUBCUTANEOUS at 12:42

## 2018-01-01 RX ADMIN — LEVOTHYROXINE SODIUM 25 MCG: 25 TABLET ORAL at 06:14

## 2018-01-01 RX ADMIN — ASPIRIN 81 MG: 81 TABLET, COATED ORAL at 17:44

## 2018-01-01 RX ADMIN — ONDANSETRON HYDROCHLORIDE 4 MG: 2 INJECTION INTRAMUSCULAR; INTRAVENOUS at 08:56

## 2018-01-01 RX ADMIN — ATORVASTATIN CALCIUM 40 MG: 40 TABLET, FILM COATED ORAL at 21:22

## 2018-01-01 RX ADMIN — CARVEDILOL 12.5 MG: 12.5 TABLET, FILM COATED ORAL at 21:04

## 2018-01-01 RX ADMIN — LEVOTHYROXINE SODIUM 25 MCG: 25 TABLET ORAL at 09:24

## 2018-01-01 RX ADMIN — INSULIN ASPART 8 UNITS: 100 INJECTION, SOLUTION INTRAVENOUS; SUBCUTANEOUS at 12:20

## 2018-01-01 RX ADMIN — HYDRALAZINE HYDROCHLORIDE 100 MG: 50 TABLET ORAL at 21:12

## 2018-01-01 RX ADMIN — INSULIN ASPART 10 UNITS: 100 INJECTION, SOLUTION INTRAVENOUS; SUBCUTANEOUS at 17:43

## 2018-01-01 RX ADMIN — INSULIN DETEMIR 30 UNITS: 100 INJECTION, SOLUTION SUBCUTANEOUS at 21:10

## 2018-01-01 RX ADMIN — ATORVASTATIN CALCIUM 40 MG: 40 TABLET, FILM COATED ORAL at 21:01

## 2018-01-01 RX ADMIN — CETIRIZINE HYDROCHLORIDE 5 MG: 5 TABLET, FILM COATED ORAL at 21:22

## 2018-01-01 RX ADMIN — CLONIDINE HYDROCHLORIDE 0.3 MG: 0.2 TABLET ORAL at 12:18

## 2018-01-01 RX ADMIN — BUDESONIDE AND FORMOTEROL FUMARATE DIHYDRATE 2 PUFF: 160; 4.5 AEROSOL RESPIRATORY (INHALATION) at 07:47

## 2018-01-01 RX ADMIN — PNEUMOCOCCAL VACCINE POLYVALENT 0.5 ML
25; 25; 25; 25; 25; 25; 25; 25; 25; 25; 25; 25; 25; 25; 25; 25; 25; 25; 25; 25; 25; 25; 25 INJECTION, SOLUTION INTRAMUSCULAR; SUBCUTANEOUS at 12:18

## 2018-01-01 RX ADMIN — INSULIN DETEMIR 30 UNITS: 100 INJECTION, SOLUTION SUBCUTANEOUS at 21:08

## 2018-01-01 RX ADMIN — HYDRALAZINE HYDROCHLORIDE 100 MG: 50 TABLET ORAL at 14:07

## 2018-01-01 RX ADMIN — OXYCODONE HYDROCHLORIDE AND ACETAMINOPHEN 1 TABLET: 10; 325 TABLET ORAL at 06:30

## 2018-01-01 RX ADMIN — AMLODIPINE BESYLATE 10 MG: 10 TABLET ORAL at 09:23

## 2018-01-01 RX ADMIN — METOCLOPRAMIDE 5 MG: 5 INJECTION, SOLUTION INTRAMUSCULAR; INTRAVENOUS at 21:22

## 2018-01-01 RX ADMIN — TAMSULOSIN HYDROCHLORIDE 0.4 MG: 0.4 CAPSULE ORAL at 08:54

## 2018-01-01 RX ADMIN — CARVEDILOL 6.25 MG: 6.25 TABLET, FILM COATED ORAL at 21:22

## 2018-01-01 RX ADMIN — INSULIN ASPART 4 UNITS: 100 INJECTION, SOLUTION INTRAVENOUS; SUBCUTANEOUS at 21:05

## 2018-01-01 RX ADMIN — HYDRALAZINE HYDROCHLORIDE 25 MG: 25 TABLET, FILM COATED ORAL at 15:39

## 2018-01-01 RX ADMIN — PIPERACILLIN SODIUM,TAZOBACTAM SODIUM 3.38 G: 3; .375 INJECTION, POWDER, FOR SOLUTION INTRAVENOUS at 09:25

## 2018-01-01 RX ADMIN — LEVOTHYROXINE SODIUM 25 MCG: 25 TABLET ORAL at 06:03

## 2018-01-01 RX ADMIN — PIPERACILLIN SODIUM,TAZOBACTAM SODIUM 3.38 G: 3; .375 INJECTION, POWDER, FOR SOLUTION INTRAVENOUS at 08:31

## 2018-01-01 RX ADMIN — HYDRALAZINE HYDROCHLORIDE 100 MG: 50 TABLET ORAL at 09:15

## 2018-01-01 RX ADMIN — LABETALOL HYDROCHLORIDE 40 MG: 5 INJECTION INTRAVENOUS at 10:41

## 2018-01-01 RX ADMIN — TAMSULOSIN HYDROCHLORIDE 0.4 MG: 0.4 CAPSULE ORAL at 17:47

## 2018-01-01 RX ADMIN — FLUTICASONE PROPIONATE 2 SPRAY: 50 SPRAY, METERED NASAL at 21:08

## 2018-01-01 RX ADMIN — HYDRALAZINE HYDROCHLORIDE 100 MG: 50 TABLET ORAL at 14:28

## 2018-01-01 RX ADMIN — LEVOTHYROXINE SODIUM 25 MCG: 25 TABLET ORAL at 06:11

## 2018-01-01 RX ADMIN — LABETALOL HYDROCHLORIDE 20 MG: 5 INJECTION, SOLUTION INTRAVENOUS at 17:45

## 2018-01-01 RX ADMIN — METOCLOPRAMIDE 5 MG: 5 INJECTION, SOLUTION INTRAMUSCULAR; INTRAVENOUS at 11:31

## 2018-01-01 RX ADMIN — PIPERACILLIN SODIUM,TAZOBACTAM SODIUM 3.38 G: 3; .375 INJECTION, POWDER, FOR SOLUTION INTRAVENOUS at 00:49

## 2018-01-01 RX ADMIN — INSULIN ASPART 12 UNITS: 100 INJECTION, SOLUTION INTRAVENOUS; SUBCUTANEOUS at 21:14

## 2018-01-01 RX ADMIN — LEVOTHYROXINE SODIUM 25 MCG: 25 TABLET ORAL at 08:33

## 2018-01-01 RX ADMIN — CLONIDINE HYDROCHLORIDE 0.3 MG: 0.2 TABLET ORAL at 05:28

## 2018-01-01 RX ADMIN — INSULIN ASPART 8 UNITS: 100 INJECTION, SOLUTION INTRAVENOUS; SUBCUTANEOUS at 17:11

## 2018-01-01 RX ADMIN — ENOXAPARIN SODIUM 40 MG: 40 INJECTION SUBCUTANEOUS at 17:38

## 2018-01-01 RX ADMIN — ENOXAPARIN SODIUM 40 MG: 40 INJECTION SUBCUTANEOUS at 17:45

## 2018-01-01 RX ADMIN — SODIUM CHLORIDE 1000 ML: 9 INJECTION, SOLUTION INTRAVENOUS at 08:49

## 2018-01-01 RX ADMIN — PANTOPRAZOLE SODIUM 40 MG: 40 TABLET, DELAYED RELEASE ORAL at 06:03

## 2018-01-01 RX ADMIN — CARVEDILOL 12.5 MG: 12.5 TABLET, FILM COATED ORAL at 08:48

## 2018-01-01 RX ADMIN — AMLODIPINE BESYLATE 10 MG: 10 TABLET ORAL at 08:48

## 2018-01-01 RX ADMIN — PIPERACILLIN SODIUM,TAZOBACTAM SODIUM 3.38 G: 3; .375 INJECTION, POWDER, FOR SOLUTION INTRAVENOUS at 17:37

## 2018-01-01 RX ADMIN — LISINOPRIL 40 MG: 40 TABLET ORAL at 15:39

## 2018-01-01 RX ADMIN — PIPERACILLIN SODIUM,TAZOBACTAM SODIUM 3.38 G: 3; .375 INJECTION, POWDER, FOR SOLUTION INTRAVENOUS at 00:30

## 2018-01-01 RX ADMIN — Medication 20 MG: at 11:13

## 2018-01-01 RX ADMIN — OXYCODONE HYDROCHLORIDE AND ACETAMINOPHEN 1 TABLET: 5; 325 TABLET ORAL at 17:42

## 2018-01-01 RX ADMIN — ASPIRIN 81 MG: 81 TABLET, COATED ORAL at 08:22

## 2018-01-01 RX ADMIN — HYDRALAZINE HYDROCHLORIDE 25 MG: 25 TABLET, FILM COATED ORAL at 21:20

## 2018-01-01 RX ADMIN — ASPIRIN 81 MG: 81 TABLET, COATED ORAL at 08:48

## 2018-01-01 RX ADMIN — METOCLOPRAMIDE 10 MG: 5 INJECTION, SOLUTION INTRAMUSCULAR; INTRAVENOUS at 09:28

## 2018-01-01 RX ADMIN — METOCLOPRAMIDE 5 MG: 5 INJECTION, SOLUTION INTRAMUSCULAR; INTRAVENOUS at 21:21

## 2018-01-01 RX ADMIN — HYDRALAZINE HYDROCHLORIDE 100 MG: 50 TABLET ORAL at 21:43

## 2018-01-01 RX ADMIN — FENOFIBRATE 145 MG: 145 TABLET ORAL at 08:33

## 2018-01-01 RX ADMIN — FENOFIBRATE 145 MG: 145 TABLET ORAL at 15:39

## 2018-01-01 RX ADMIN — CLONIDINE HYDROCHLORIDE 0.3 MG: 0.2 TABLET ORAL at 21:13

## 2018-01-01 RX ADMIN — INSULIN DETEMIR 30 UNITS: 100 INJECTION, SOLUTION SUBCUTANEOUS at 21:15

## 2018-01-01 RX ADMIN — HYDRALAZINE HYDROCHLORIDE 50 MG: 50 TABLET, FILM COATED ORAL at 05:32

## 2018-01-01 RX ADMIN — OXYCODONE HYDROCHLORIDE AND ACETAMINOPHEN 1 TABLET: 10; 325 TABLET ORAL at 17:48

## 2018-01-01 RX ADMIN — ACETAMINOPHEN 650 MG: 325 TABLET ORAL at 21:20

## 2018-01-01 RX ADMIN — ATORVASTATIN CALCIUM 40 MG: 40 TABLET, FILM COATED ORAL at 21:43

## 2018-01-01 RX ADMIN — INSULIN ASPART 8 UNITS: 100 INJECTION, SOLUTION INTRAVENOUS; SUBCUTANEOUS at 17:37

## 2018-01-01 RX ADMIN — CLONIDINE HYDROCHLORIDE 0.3 MG: 0.2 TABLET ORAL at 17:44

## 2018-01-01 RX ADMIN — ENOXAPARIN SODIUM 30 MG: 30 INJECTION SUBCUTANEOUS at 15:39

## 2018-01-01 RX ADMIN — LABETALOL HYDROCHLORIDE 20 MG: 5 INJECTION INTRAVENOUS at 09:34

## 2018-01-01 RX ADMIN — AMLODIPINE BESYLATE 10 MG: 10 TABLET ORAL at 09:16

## 2018-01-01 RX ADMIN — CITALOPRAM HYDROBROMIDE 20 MG: 20 TABLET ORAL at 15:39

## 2018-01-01 RX ADMIN — ATORVASTATIN CALCIUM 40 MG: 40 TABLET, FILM COATED ORAL at 08:33

## 2018-01-01 RX ADMIN — OXYCODONE HYDROCHLORIDE AND ACETAMINOPHEN 1 TABLET: 5; 325 TABLET ORAL at 04:34

## 2018-01-01 RX ADMIN — FLUTICASONE PROPIONATE 2 SPRAY: 50 SPRAY, METERED NASAL at 08:35

## 2018-01-01 RX ADMIN — CITALOPRAM HYDROBROMIDE 40 MG: 40 TABLET ORAL at 08:12

## 2018-01-01 RX ADMIN — INSULIN ASPART 8 UNITS: 100 INJECTION, SOLUTION INTRAVENOUS; SUBCUTANEOUS at 21:12

## 2018-01-01 RX ADMIN — Medication 12.5 MG: at 08:22

## 2018-01-01 RX ADMIN — CARVEDILOL 6.25 MG: 6.25 TABLET, FILM COATED ORAL at 09:24

## 2018-01-01 RX ADMIN — PANTOPRAZOLE SODIUM 40 MG: 40 TABLET, DELAYED RELEASE ORAL at 05:38

## 2018-01-01 RX ADMIN — ENALAPRILAT 1.25 MG: 1.25 INJECTION INTRAVENOUS at 10:49

## 2018-01-01 RX ADMIN — ASPIRIN 81 MG: 81 TABLET, COATED ORAL at 08:33

## 2018-01-01 RX ADMIN — LORAZEPAM 0.5 MG: 2 INJECTION INTRAMUSCULAR; INTRAVENOUS at 09:30

## 2018-01-01 RX ADMIN — TAMSULOSIN HYDROCHLORIDE 0.4 MG: 0.4 CAPSULE ORAL at 08:33

## 2018-01-01 RX ADMIN — ASPIRIN 81 MG: 81 TABLET, COATED ORAL at 08:12

## 2018-01-01 RX ADMIN — METOCLOPRAMIDE 5 MG: 5 INJECTION, SOLUTION INTRAMUSCULAR; INTRAVENOUS at 17:11

## 2018-01-01 RX ADMIN — INSULIN DETEMIR 30 UNITS: 100 INJECTION, SOLUTION SUBCUTANEOUS at 21:44

## 2018-01-01 RX ADMIN — CETIRIZINE HYDROCHLORIDE 5 MG: 5 TABLET, FILM COATED ORAL at 12:42

## 2018-01-01 RX ADMIN — CITALOPRAM HYDROBROMIDE 20 MG: 20 TABLET ORAL at 09:24

## 2018-01-01 RX ADMIN — CITALOPRAM HYDROBROMIDE 40 MG: 40 TABLET ORAL at 08:54

## 2018-01-01 RX ADMIN — HYDRALAZINE HYDROCHLORIDE 50 MG: 50 TABLET, FILM COATED ORAL at 21:05

## 2018-01-01 RX ADMIN — INSULIN ASPART 8 UNITS: 100 INJECTION, SOLUTION INTRAVENOUS; SUBCUTANEOUS at 21:10

## 2018-01-01 RX ADMIN — INSULIN ASPART 10 UNITS: 100 INJECTION, SOLUTION INTRAVENOUS; SUBCUTANEOUS at 08:13

## 2018-01-01 RX ADMIN — OXYCODONE HYDROCHLORIDE AND ACETAMINOPHEN 1 TABLET: 5; 325 TABLET ORAL at 11:31

## 2018-01-01 RX ADMIN — HYDROMORPHONE HYDROCHLORIDE 4 MG: 4 TABLET ORAL at 20:24

## 2018-01-01 RX ADMIN — CLONIDINE HYDROCHLORIDE 0.3 MG: 0.2 TABLET ORAL at 08:33

## 2018-01-01 RX ADMIN — CLONIDINE HYDROCHLORIDE 0.3 MG: 0.2 TABLET ORAL at 06:13

## 2018-01-01 RX ADMIN — METOCLOPRAMIDE 5 MG: 5 INJECTION, SOLUTION INTRAMUSCULAR; INTRAVENOUS at 16:48

## 2018-01-01 RX ADMIN — CEFTRIAXONE 1 G: 1 INJECTION, POWDER, FOR SOLUTION INTRAMUSCULAR; INTRAVENOUS at 10:57

## 2018-01-01 RX ADMIN — AMLODIPINE BESYLATE 10 MG: 10 TABLET ORAL at 17:47

## 2018-01-01 RX ADMIN — INSULIN ASPART 12 UNITS: 100 INJECTION, SOLUTION INTRAVENOUS; SUBCUTANEOUS at 21:23

## 2018-01-01 RX ADMIN — CARVEDILOL 6.25 MG: 6.25 TABLET, FILM COATED ORAL at 08:12

## 2018-01-01 RX ADMIN — HYDRALAZINE HYDROCHLORIDE 50 MG: 50 TABLET, FILM COATED ORAL at 20:38

## 2018-01-01 RX ADMIN — INSULIN ASPART 11 UNITS: 100 INJECTION, SOLUTION INTRAVENOUS; SUBCUTANEOUS at 17:16

## 2018-01-01 RX ADMIN — CLONIDINE HYDROCHLORIDE 0.3 MG: 0.2 TABLET ORAL at 09:24

## 2018-01-01 RX ADMIN — NITROGLYCERIN 1 INCH: 20 OINTMENT TOPICAL at 12:32

## 2018-01-01 RX ADMIN — ASPIRIN 81 MG: 81 TABLET, COATED ORAL at 08:34

## 2018-01-01 RX ADMIN — METOCLOPRAMIDE 5 MG: 5 INJECTION, SOLUTION INTRAMUSCULAR; INTRAVENOUS at 11:39

## 2018-01-01 RX ADMIN — BUDESONIDE AND FORMOTEROL FUMARATE DIHYDRATE 2 PUFF: 160; 4.5 AEROSOL RESPIRATORY (INHALATION) at 19:30

## 2018-01-01 RX ADMIN — METOCLOPRAMIDE 5 MG: 5 INJECTION, SOLUTION INTRAMUSCULAR; INTRAVENOUS at 08:12

## 2018-01-01 RX ADMIN — ASPIRIN 81 MG 324 MG: 81 TABLET ORAL at 12:32

## 2018-01-01 RX ADMIN — AMLODIPINE BESYLATE 10 MG: 10 TABLET ORAL at 08:23

## 2018-01-01 RX ADMIN — OXYCODONE HYDROCHLORIDE AND ACETAMINOPHEN 1 TABLET: 5; 325 TABLET ORAL at 06:13

## 2018-01-01 RX ADMIN — ATORVASTATIN CALCIUM 40 MG: 40 TABLET, FILM COATED ORAL at 08:48

## 2018-01-01 RX ADMIN — CLONIDINE HYDROCHLORIDE 0.3 MG: 0.2 TABLET ORAL at 11:36

## 2018-01-01 RX ADMIN — CITALOPRAM HYDROBROMIDE 40 MG: 40 TABLET ORAL at 08:22

## 2018-01-01 RX ADMIN — OXYCODONE HYDROCHLORIDE AND ACETAMINOPHEN 1 TABLET: 5; 325 TABLET ORAL at 14:12

## 2018-01-01 RX ADMIN — CLONIDINE HYDROCHLORIDE 0.3 MG: 0.2 TABLET ORAL at 06:03

## 2018-01-01 RX ADMIN — PIPERACILLIN SODIUM,TAZOBACTAM SODIUM 3.38 G: 3; .375 INJECTION, POWDER, FOR SOLUTION INTRAVENOUS at 08:36

## 2018-01-01 RX ADMIN — Medication 10 ML: at 13:17

## 2018-01-01 RX ADMIN — ATORVASTATIN CALCIUM 40 MG: 40 TABLET, FILM COATED ORAL at 21:14

## 2018-01-01 RX ADMIN — CETIRIZINE HYDROCHLORIDE 5 MG: 5 TABLET, FILM COATED ORAL at 21:14

## 2018-01-01 RX ADMIN — ATORVASTATIN CALCIUM 40 MG: 40 TABLET, FILM COATED ORAL at 21:09

## 2018-01-01 RX ADMIN — SODIUM CHLORIDE 1000 ML: 900 INJECTION, SOLUTION INTRAVENOUS at 10:15

## 2018-01-01 RX ADMIN — ASPIRIN 81 MG: 81 TABLET, COATED ORAL at 09:24

## 2018-01-01 RX ADMIN — LEVOTHYROXINE SODIUM 25 MCG: 25 TABLET ORAL at 15:39

## 2018-01-01 RX ADMIN — BUDESONIDE AND FORMOTEROL FUMARATE DIHYDRATE 2 PUFF: 160; 4.5 AEROSOL RESPIRATORY (INHALATION) at 07:43

## 2018-01-01 RX ADMIN — PIPERACILLIN SODIUM,TAZOBACTAM SODIUM 3.38 G: 3; .375 INJECTION, POWDER, FOR SOLUTION INTRAVENOUS at 18:00

## 2018-01-01 RX ADMIN — TAMSULOSIN HYDROCHLORIDE 0.4 MG: 0.4 CAPSULE ORAL at 08:12

## 2018-01-01 RX ADMIN — ACETAMINOPHEN 650 MG: 325 TABLET ORAL at 14:50

## 2018-01-01 RX ADMIN — PANTOPRAZOLE SODIUM 40 MG: 40 TABLET, DELAYED RELEASE ORAL at 06:33

## 2018-01-01 RX ADMIN — INSULIN DETEMIR 30 UNITS: 100 INJECTION, SOLUTION SUBCUTANEOUS at 20:28

## 2018-01-01 RX ADMIN — HYDRALAZINE HYDROCHLORIDE 100 MG: 50 TABLET ORAL at 08:12

## 2018-01-01 RX ADMIN — HYDRALAZINE HYDROCHLORIDE 100 MG: 50 TABLET ORAL at 17:44

## 2018-01-01 RX ADMIN — ASPIRIN 81 MG: 81 TABLET, COATED ORAL at 09:16

## 2018-01-01 RX ADMIN — CITALOPRAM HYDROBROMIDE 40 MG: 40 TABLET ORAL at 09:16

## 2018-01-01 RX ADMIN — ENOXAPARIN SODIUM 40 MG: 40 INJECTION SUBCUTANEOUS at 18:00

## 2018-01-01 RX ADMIN — ENOXAPARIN SODIUM 40 MG: 40 INJECTION SUBCUTANEOUS at 17:11

## 2018-01-01 RX ADMIN — TAMSULOSIN HYDROCHLORIDE 0.4 MG: 0.4 CAPSULE ORAL at 08:48

## 2018-01-01 RX ADMIN — CETIRIZINE HYDROCHLORIDE 5 MG: 5 TABLET, FILM COATED ORAL at 21:09

## 2018-01-01 RX ADMIN — ASPIRIN 81 MG: 81 TABLET, COATED ORAL at 08:54

## 2018-01-01 RX ADMIN — CLONIDINE HYDROCHLORIDE 0.3 MG: 0.2 TABLET ORAL at 20:24

## 2018-01-01 RX ADMIN — METOPROLOL TARTRATE 12.5 MG: 25 TABLET, FILM COATED ORAL at 20:24

## 2018-01-01 RX ADMIN — LEVOTHYROXINE SODIUM 25 MCG: 25 TABLET ORAL at 06:13

## 2018-01-01 RX ADMIN — CITALOPRAM HYDROBROMIDE 40 MG: 40 TABLET ORAL at 17:48

## 2018-01-01 RX ADMIN — CETIRIZINE HYDROCHLORIDE 5 MG: 5 TABLET, FILM COATED ORAL at 21:01

## 2018-01-01 RX ADMIN — ENOXAPARIN SODIUM 40 MG: 40 INJECTION SUBCUTANEOUS at 16:48

## 2018-01-01 RX ADMIN — METOCLOPRAMIDE 5 MG: 5 INJECTION, SOLUTION INTRAMUSCULAR; INTRAVENOUS at 21:09

## 2018-01-01 RX ADMIN — HYDRALAZINE HYDROCHLORIDE 50 MG: 50 TABLET, FILM COATED ORAL at 06:03

## 2018-01-01 RX ADMIN — INSULIN ASPART 3 UNITS: 100 INJECTION, SOLUTION INTRAVENOUS; SUBCUTANEOUS at 08:55

## 2018-01-01 RX ADMIN — INSULIN DETEMIR 35 UNITS: 100 INJECTION, SOLUTION SUBCUTANEOUS at 21:01

## 2018-05-31 PROBLEM — I21.4 NSTEMI (NON-ST ELEVATED MYOCARDIAL INFARCTION) (HCC): Status: ACTIVE | Noted: 2018-01-01

## 2018-06-01 PROBLEM — N17.9 AKI (ACUTE KIDNEY INJURY) (HCC): Status: ACTIVE | Noted: 2018-01-01

## 2018-06-01 PROBLEM — R77.8 ELEVATED TROPONIN: Status: ACTIVE | Noted: 2018-01-01

## 2018-06-01 PROBLEM — R80.9 PROTEINURIA: Status: ACTIVE | Noted: 2018-01-01

## 2018-06-01 PROBLEM — I16.0 HYPERTENSIVE URGENCY: Status: ACTIVE | Noted: 2018-01-01

## 2018-06-02 PROBLEM — N18.4 CKD (CHRONIC KIDNEY DISEASE) STAGE 4, GFR 15-29 ML/MIN (HCC): Status: ACTIVE | Noted: 2018-01-01

## 2018-08-20 PROBLEM — J18.9 PNEUMONIA OF RIGHT UPPER LOBE DUE TO INFECTIOUS ORGANISM: Status: ACTIVE | Noted: 2018-01-01

## 2018-08-20 PROBLEM — R10.84 GENERALIZED ABDOMINAL PAIN: Status: ACTIVE | Noted: 2018-01-01

## 2018-08-20 PROBLEM — D72.829 LEUKOCYTOSIS: Status: ACTIVE | Noted: 2018-01-01

## 2018-08-20 PROBLEM — R11.2 INTRACTABLE VOMITING WITH NAUSEA: Status: ACTIVE | Noted: 2018-01-01

## 2018-08-20 NOTE — ED NOTES
Notified Dr. Cosby of patient's blood pressure upon returning to ED from CT.          Ashleigh Gimenez RN  08/20/18 1645

## 2018-08-20 NOTE — ED NOTES
Lab here to draw patient's blood for cultures and lactic acid.        Ashleigh Gimenez RN  08/20/18 0923

## 2018-08-20 NOTE — PLAN OF CARE
Problem: Patient Care Overview  Goal: Plan of Care Review  Outcome: Ongoing (interventions implemented as appropriate)   08/20/18 6198   Coping/Psychosocial   Plan of Care Reviewed With patient   Plan of Care Review   Progress no change   OTHER   Outcome Summary pt admitted from ER; alert and oriented; blood pressure elevated, labatelol given, will recheck in 30 minutes     Goal: Individualization and Mutuality  Outcome: Ongoing (interventions implemented as appropriate)    Goal: Discharge Needs Assessment  Outcome: Ongoing (interventions implemented as appropriate)    Goal: Interprofessional Rounds/Family Conf  Outcome: Ongoing (interventions implemented as appropriate)      Problem: Pneumonia (Adult)  Goal: Signs and Symptoms of Listed Potential Problems Will be Absent, Minimized or Managed (Pneumonia)  Outcome: Ongoing (interventions implemented as appropriate)      Problem: Pain, Chronic (Adult)  Goal: Identify Related Risk Factors and Signs and Symptoms  Outcome: Ongoing (interventions implemented as appropriate)    Goal: Acceptable Pain/Comfort Level and Functional Ability  Outcome: Ongoing (interventions implemented as appropriate)

## 2018-08-20 NOTE — ED NOTES
Nurse unable to take report at this time. Reports will return call.      Mariana Carlos, RN  08/20/18 5297

## 2018-08-20 NOTE — H&P
[facility] Admission      Date of Admission: 8/20/2018      Primary Care Physician: Provider, No Known      Chief Complaint: nausea , vomiting and abdominal pain     HPI:  73 year old male patient who came to the ED due to nauseas, vomiting and abdominal pain. Everything started yesterday, initially he started with nausea and vomiting, this last described as non bilious non bloody, more than 30 episodes, most recently just having dry heaves. Abdominal pain is described as diffused, constant, non radiated, 7/10 in severity, aggravated by meals, not alleviating factors. At the ED BP was elevated with criteria for hypertensive urgency , labs were remarkable for leukocytosis , hyperglycemia , CT chest/abdomen showed Left upper lobe anterior segment perihilar as well as bibasilar superior segment small patchy opacities suspicious for multifocal pneumonia. He was admitted recently at another facility for same presentation.      Concurrent Medical History:   Past Medical History:   Diagnosis Date   • Allergic rhinitis    • Backache    • Chest pain, unspecified    • Chronic obstructive lung disease (CMS/HCC)    • Chronic renal impairment    • Chronic rhinitis    • CKD (chronic kidney disease), unspecified    • Colonic polyps    • Constipation    • Depressive disorder    • Diverticular disease of colon    • Diverticulitis of colon    • Dyspnea    • Encounter for immunization     Hepatitis B   • Encounter for screening for other suspected endocrine disorder    • Essential hypertension    • Gastritis    • Generalized anxiety disorder    • GERD (gastroesophageal reflux disease)    • Headache    • Hemorrhoids    • Hiatal hernia    • Hyperkalemia    • Hyperlipidemia     unspecified   • Hypothyroidism, unspecified    • Living alone    • Male erectile disorder    • Obesity    • Pain in lower limb    • Polyp of colon    • Temporomandibular joint disorder     upper & lower teeth  show marked degree malocclusio    • Tobacco dependence syndrome    • Type 2 diabetes mellitus (CMS/HCC)    • Vitamin D deficiency, unspecified          Past Surgical History:   Past Surgical History:   Procedure Laterality Date   • BACK SURGERY     • COLONOSCOPY      sm.sessile polyp in anamika.colon,sigmoid,rectum,a few diverticula in sig.,hemorrhoids   • ENDOSCOPY      nonerosive gastritis of antrum,biopsy obtained,reflex disease   • KNEE SURGERY      left meniscectomy   • LUMBAR LAMINECTOMY     • SHOULDER SURGERY      open reduction of right shoulder fracture Done at [facility] by Dr Zurita       Family History: family history includes Coronary artery disease in his other; Drug abuse in his other; Heart disease in his other; Hypertension in his other; Stroke in his other.     Social History:  reports that he has quit smoking. He has never used smokeless tobacco. He reports that he does not drink alcohol or use drugs.    Allergies:   Allergies   Allergen Reactions   • Darvon [Propoxyphene] Swelling   • Meperidine And Related    • Morphine And Related GI Intolerance     Pt states IV is ok, PO makes him nauseous.   • Other      Darvocet       Medications:   Prescriptions Prior to Admission   Medication Sig Dispense Refill Last Dose   • aspirin 81 MG EC tablet Take 1 tablet by mouth Daily. 30 tablet 0 Past Week at Unknown time   • atorvastatin (LIPITOR) 40 MG tablet TAKE 1 TABLET DAILY AT BEDTIME 30 tablet 0 Past Week at Unknown time   • budesonide-formoterol (SYMBICORT) 160-4.5 MCG/ACT inhaler Inhale 2 puffs 2 (Two) Times a Day.   Past Week at Unknown time   • cetirizine (zyrTEC) 5 MG tablet Take 1 tablet by mouth Daily. 30 tablet 0 Past Week at Unknown time   • citalopram (CeleXA) 40 MG tablet Take 40 mg by mouth Daily.   Past Week at Unknown time   • CloNIDine (CATAPRES) 0.3 MG tablet Take 0.3 mg by mouth 3 (Three) Times a Day.   Past Week at Unknown time   • esomeprazole (nexIUM) 40 MG capsule Take 40 mg by mouth 2 (Two)  Times a Day.   Past Week at Unknown time   • hydrALAZINE (APRESOLINE) 100 MG tablet Take 100 mg by mouth Every 8 (Eight) Hours.   Past Week at Unknown time   • insulin aspart (novoLOG FLEXPEN) 100 UNIT/ML solution pen-injector sc pen Inject 20 Units under the skin into the appropriate area as directed 3 (Three) Times a Day As Needed (meals, sliding scale).   Past Week at Unknown time   • metFORMIN (GLUCOPHAGE) 500 MG tablet Take 500 mg by mouth Every Morning.   Past Week at Unknown time   • albuterol (PROAIR HFA) 108 (90 BASE) MCG/ACT inhaler Inhale 2 puffs 4 (Four) Times a Day. 1 inhaler 11 Unknown at Unknown time   • amLODIPine (NORVASC) 10 MG tablet Take 1 tablet by mouth Daily. 90 tablet 3 Unknown at Unknown time   • B-D UF III MINI PEN NEEDLES 31G X 5 MM misc USE 4 TIMES DAILY. 120 each 11    • baclofen (LIORESAL) 10 MG tablet Take 5-10 mg by mouth 3 (Three) Times a Day As Needed for Muscle Spasms. Take one-half to one tablet by mouth three times daily as needed for muscle spasms   Unknown at Unknown time   • carvedilol (COREG) 6.25 MG tablet Take 1 tablet by mouth Every 12 (Twelve) Hours. 60 tablet 0 Unknown at Unknown time   • clobetasol (TEMOVATE) 0.05 % external solution Apply 1 application topically to the appropriate area as directed Daily. Apply topically to scalp daily   Unknown at Unknown time   • fluticasone (FLONASE) 50 MCG/ACT nasal spray USE 2 SPRAYS IN EACH NOSTRIL DAILY. 16 g 0 Unknown at Unknown time   • insulin glargine (LANTUS) 100 UNIT/ML injection Inject 30 Units under the skin every night.   Unknown at Unknown time   • Insulin Pen Needle (PEN NEEDLES) 31G X 6 MM misc As Directed   5/31/2018 at Unknown time   • levothyroxine (SYNTHROID, LEVOTHROID) 50 MCG tablet Take 50 mcg by mouth Daily.   Unknown at Unknown time   • oxyCODONE-acetaminophen (PERCOCET)  MG per tablet Take 1 tablet by mouth Every 6 (Six) Hours As Needed for Moderate Pain .   Unknown at Unknown time   • sodium  polystyrene (KAYEXALATE) 15 GM/60ML suspension Take 15 g by mouth 1 (One) Time. Take 60 ml (15 gm) by mouth three times weekly   Unknown at Unknown time   • tamsulosin (FLOMAX) 0.4 MG capsule 24 hr capsule Take 1 capsule by mouth Daily. 90 capsule 3 Unknown at Unknown time       Review of Systems:  Review of Systems all 12 point were reviewed and they were negative, except for nauseas , vomiting and diffuse abdominal pain.       Physical Exam:   Temp:  [97.6 °F (36.4 °C)-98.7 °F (37.1 °C)] 97.6 °F (36.4 °C)  Heart Rate:  [72-96] 92  Resp:  [20-21] 21  BP: (182-270)/() 189/84  Physical Exam  GENERAL APPEARANCE: Well developed, well nourished, alert and cooperative, , not acute distress.  HEENT: normocephalic. PERRL, EOMI, vision is grossly intact.: External auditory canals and tympanic membranes clear, hearing grossly intact. No nasal discharge. Oral cavity and pharynx normal. No inflammation, swelling, exudate, or lesions. Teeth and gingiva in good general condition.  NECK: Neck supple, non-tender without lymphadenopathy, masses or thyromegaly.  CARDIAC: Normal S1 and S2. RRR, not M/R/G.   LUNGS: Clear to auscultation and percussion without rales, rhonchi, wheezing or diminished breath sounds.  ABDOMEN: diffusely tenderness, BS increased, not organomegaly,  MUSKULOSKELETAL: ROM intact spine and extremities. No joint erythema or tenderness. Normal muscular development. Normal gait.  BACK: Examination of the spine reveals normal gait and posture, no spinal deformity, symmetry of spinal muscles, without tenderness, decreased range of motion or muscular spasm.  EXTREMITIES: No significant deformity or joint abnormality. No edema. Peripheral pulses intact. No varicosities.  NEUROLOGICAL: CN II-XII intact. Strength and sensation symmetric and intact throughout. Reflexes 2+ throughout. Cerebellar testing normal.  SKIN: Skin normal color, texture and turgor with no lesions or eruptions.  PSYCHIATRIC: oriented x 3 .   good judgement and reason, without hallucinations, abnormal affect or abnormal behaviors during the examination.  not suicidal.      Results Reviewed:  I have personally reviewed current lab, radiology, and data and agree with results.  Lab Results (last 24 hours)     Procedure Component Value Units Date/Time    Lactic Acid, Plasma [[id number]]  (Normal) Collected:  08/20/18 0952    Specimen:  Blood Updated:  08/20/18 1013     Lactate 1.8 mmol/L     Blood Culture - Blood, [[id number]] Collected:  08/20/18 0957    Specimen:  Blood from Arm, Right Updated:  08/20/18 1001    Blood Culture - Blood, [[id number]] Collected:  08/20/18 0957    Specimen:  Blood from Hand, Right Updated:  08/20/18 1000    [place] Draw [[id number]] Collected:  08/20/18 0856    Specimen:  Blood Updated:  08/20/18 1000    Narrative:       The following orders were created for panel order [place] Draw.  Procedure                               Abnormality         Status                     ---------                               -----------         ------                     Light Blue Top[[id number]]                                   Final result               Green Top (Gel)[[id number]]                                  Final result               Lavender Top[[id number]]                                     Final result               Gold Top - SST[[id number]]                                   Final result                 Please view results for these tests on the individual orders.    Light Blue Top [[id number]] Collected:  08/20/18 0856    Specimen:  Blood Updated:  08/20/18 1000     Extra Tube hold for add-on     Comment: Auto resulted       Green Top (Gel) [[id number]] Collected:  08/20/18 0856    Specimen:  Blood Updated:  08/20/18 1000     Extra Tube Hold for add-ons.     Comment: Auto resulted.       Lavender Top [[id number]] Collected:  08/20/18 0856    Specimen:  Blood Updated:  08/20/18 1000     Extra Tube hold for add-on     Comment: Auto resulted        Gold Top - [facility] [[id number]] Collected:  08/20/18 0856    Specimen:  Blood Updated:  08/20/18 1000     Extra Tube Hold for add-ons.     Comment: Auto resulted.       Amylase [[id number]]  (Abnormal) Collected:  08/20/18 0856    Specimen:  Blood Updated:  08/20/18 0922     Amylase 38 (L) U/L     Comprehensive Metabolic Panel [[id number]]  (Abnormal) Collected:  08/20/18 0856    Specimen:  Blood Updated:  08/20/18 0914     Glucose 333 (H) mg/dL      BUN 34 (H) mg/dL      Creatinine 2.05 (H) mg/dL      Sodium 134 (L) mmol/L      Potassium 5.0 mmol/L      Chloride 103 mmol/L      CO2 16.0 (L) mmol/L      Calcium 9.4 mg/dL      Total Protein 7.2 g/dL      Albumin 4.00 g/dL      ALT (SGPT) 17 (L) U/L      AST (SGOT) 26 U/L      Alkaline Phosphatase 73 U/L      Total Bilirubin 0.9 mg/dL      eGFR Non African Amer 32 (L) mL/min/1.73      Globulin 3.2 gm/dL      A/G Ratio 1.3 g/dL      BUN/Creatinine Ratio 16.6     Anion Gap 15.0 mmol/L     Narrative:       The MDRD GFR formula is only valid for adults with stable renal function between ages 18 and 70.    Lipase [[id number]]  (Normal) Collected:  08/20/18 0856    Specimen:  Blood Updated:  08/20/18 0914     Lipase 25 U/L     Urinalysis, Microscopic Only - Urine, Clean Catch [[id number]]  (Abnormal) Collected:  08/20/18 0856    Specimen:  Urine from Urine, Clean Catch Updated:  08/20/18 0909     RBC, UA 3-5 (A) /HPF      WBC, UA 0-2 /HPF      Bacteria, UA None Seen /HPF      Squamous Epithelial Cells, UA None Seen /HPF      Hyaline Casts, UA 0-2 /LPF      Methodology Automated Microscopy    Urinalysis With Microscopic If Indicated (No Culture) - Urine, Clean Catch [[id number]]  (Abnormal) Collected:  08/20/18 0856    Specimen:  Urine from Urine, Clean Catch Updated:  08/20/18 0908     Color, UA Yellow     Appearance, UA Clear     pH, UA 5.5     Specific Gravity, UA 1.016     Glucose, UA >=1000 mg/dL (3+) (A)     Ketones, UA Negative     Bilirubin, UA Negative     Blood, UA  Negative     Protein, UA >=300 mg/dL (3+) (A)     Leuk Esterase, UA Negative     Nitrite, UA Negative     Urobilinogen, UA 0.2 E.U./dL    CBC & Differential [[id number]] Collected:  08/20/18 0856    Specimen:  Blood Updated:  08/20/18 0904    Narrative:       The following orders were created for panel order CBC & Differential.  Procedure                               Abnormality         Status                     ---------                               -----------         ------                     CBC Auto Differential[[id number]]        Abnormal            Final result                 Please view results for these tests on the individual orders.    CBC Auto Differential [[id number]]  (Abnormal) Collected:  08/20/18 0856    Specimen:  Blood Updated:  08/20/18 0904     WBC 17.14 (H) 10*3/mm3      RBC 3.70 (L) 10*6/mm3      Hemoglobin 12.0 (L) g/dL      Hematocrit 33.3 (L) %      MCV 90.0 fL      MCH 32.4 pg      MCHC 36.0 g/dL      RDW 12.5 %      RDW-SD 40.7 fl      MPV 10.8 fL      Platelets 263 10*3/mm3      Neutrophil % 80.1 (H) %      Lymphocyte % 8.4 (L) %      Monocyte % 9.7 %      Eosinophil % 1.0 %      Basophil % 0.4 %      Immature Grans % 0.4 %      Neutrophils, Absolute 13.74 (H) 10*3/mm3      Lymphocytes, Absolute 1.44 10*3/mm3      Monocytes, Absolute 1.66 (H) 10*3/mm3      Eosinophils, Absolute 0.17 10*3/mm3      Basophils, Absolute 0.06 10*3/mm3      Immature Grans, Absolute 0.07 (H) 10*3/mm3         Imaging Results (last 24 hours)     Procedure Component Value Units Date/Time    CT Abdomen Pelvis Without Contrast [[id number]] Collected:  08/20/18 1111     Updated:  08/20/18 1223    Narrative:         PROCEDURE: Ct abdomen and pelvis without contrast    REASON FOR EXAM: vomiting    FINDINGS: Comparison study dated  May 4, 2014. Axial computer  tomography sequential imaging was performed from the diaphragms  through the symphysis pubis without IV contrast administration.  Sagittal and coronal reformates  was performed. This exam was  performed according to our departmental dose optimization  program, which includes automated exposure control, adjustment of  the mA and/or KV according to patient size and/or use of  iterative reconstruction technique.     Small paraseptal blebs are seen involving left lung base. Left  upper lobe anterior segment perihilar as well as bibasilar  superior segment small patchy opacities. Very small bilateral  pleural effusions.    The liver is normal. The gallbladder is normal. The biliary  system is normal. The pancreas is normal. The spleen is normal.  Bilateral adrenal glands are normal. Right kidney and ureter are  normal. Left kidney and ureter are normal. The bladder is normal.  The prostate is normal. The hollow viscera is normal. The  appendix is identified appears normal. No lymphadenopathy in the  abdomen or pelvis. Atherosclerotic vascular calcifications of the  abdominal aorta. No acute osseous abnormality.      Impression:       1. Left upper lobe anterior segment perihilar as well as  bibasilar superior segment small patchy opacities suspicious for  multifocal pneumonia..  2. Very small bilateral pleural effusions..  3. Otherwise unremarkable unenhanced CT abdomen pelvis study..    Electronically signed by:  Chuck Cary MD  8/20/2018 12:22 PM CDT  Workstation: [id number]    CT Head Without Contrast [[id number]] Collected:  08/20/18 1109     Updated:  08/20/18 1146    Narrative:       Headaches.    CT, head without intravenous contrast.    Comparison is made with CT dated August 6, 2016.    Radiation dose-limiting techniques also utilized, including  automated exposure control and adjustment of mA and/or KVP to the  patient size according to ALARA (as low as reasonably  achievable).    There is mild atrophy. The ventricles are normal in size. No  midline shift is identified. There are mild periventricular and  white matter hypodensities. There is no evidence of acute  ischemia,  intracranial hemorrhage or mass. No acute abnormality  is seen in the posterior fossa. No pituitary lesion is  identified.    There is mucosal thickening in the right maxillary sinus. The  calvarium is intact.      Impression:       CONCLUSION:  1. No acute intracranial abnormality is identified.  2. Mild atrophy and mild microvascular changes.    Electronically signed by:  Segundo Prasad MD  8/20/2018 11:45  AM CDT Workstation: [id number]    XR Chest 1 View [[id number]] Collected:  08/20/18 0950     Updated:  08/20/18 1023    Narrative:         PROCEDURE: Single chest view AP    REASON FOR EXAM:htn    FINDINGS: Comparison exam dated May 31, 2014. Cardiac and  pulmonary vasculature are normal. Right upper lobe small patchy  opacity. Lungs are otherwise clear. Pleural spaces are normal. No  acute osseous abnormality. Left clavicle mid shaft old healed  fracture.      Impression:       1.  Right upper lobe small patchy opacity suspicious for  pneumonia. Recommend follow chest x-ray in 7-10 days to document  improvement and/or resolution.    Electronically signed by:  Chuck Cary MD  8/20/2018 10:22 AM CDT  Workstation: [id number]            Assessment/Plan :    Aspiration pneumonia   - start zosyn and duoneb, secondary to recurrent vomiting       Hypertensive urgency   - maybe secondary to dry heave/recthing, missing home emds, will resume home meds, add PRN IV labetalol and vasotec , will monitor and adjust accordingly       Intractable nausea and vomiting  - maybe secondary to gastroparesis, this is being recurrent lately, will start reglan, may consider GI consult if not improvement     Generalized abdominal pain     - gastritis versus PUD versus gastroparesis  - will order protonix, IV reglan , clear liquid diet     Morbid obesity   Weight loss, diet and exercise recommended     DM type 2   - complicated with hyperglycemia  Start IV fluids, insulin sliding scale, continue home regimen of insulin        Leukocytosis   - likely reactive versus hemoconcentration                 I discussed the patient's findings and my recommendations with: patient      Code status and advance care of plan was discussed with patient and want to be  Full code, has not POA , has not decision in regards yet     Graciela Hurtado MD

## 2018-08-20 NOTE — ED PROVIDER NOTES
Subjective   74yo male pmh significant htn/hyperlipidemia/dm2/ckd/ddd presents ED c/o 3d hx intractable nausea, vomiting.  ROS (+) generalized abdominal tenderness, headache, uncontrolled bp.  Denies fever/chills/soa/chest pain/ dysuria/melena/hematochoezia/hematemesis.  Pt reportedly recently hospitalized [facility] 10 days previous for same.        History provided by:  Patient  Illness   Severity:  Moderate  Onset quality:  Gradual  Duration:  3 days  Timing:  Constant  Chronicity:  Recurrent  Associated symptoms: abdominal pain, headaches, nausea and vomiting    Associated symptoms: no diarrhea        Review of Systems   Constitutional: Negative.    Eyes: Negative.    Respiratory: Negative.    Cardiovascular: Negative.    Gastrointestinal: Positive for abdominal pain, nausea and vomiting. Negative for blood in stool and diarrhea.   Genitourinary: Negative.    Musculoskeletal: Positive for back pain.   Skin: Negative.    Neurological: Positive for headaches.   All other systems reviewed and are negative.      Past Medical History:   Diagnosis Date   • Allergic rhinitis    • Backache    • Chest pain, unspecified    • Chronic obstructive lung disease (CMS/HCC)    • Chronic renal impairment    • Chronic rhinitis    • CKD (chronic kidney disease), unspecified    • Colonic polyps    • Constipation    • Depressive disorder    • Diverticular disease of colon    • Diverticulitis of colon    • Dyspnea    • Encounter for immunization     Hepatitis B   • Encounter for screening for other suspected endocrine disorder    • Essential hypertension    • Gastritis    • Generalized anxiety disorder    • GERD (gastroesophageal reflux disease)    • Headache    • Hemorrhoids    • Hiatal hernia    • Hyperkalemia    • Hyperlipidemia     unspecified   • Hypothyroidism, unspecified    • Living alone    • Male erectile disorder    • Obesity    • Pain in lower limb    • Polyp of colon    • Temporomandibular joint disorder      upper & lower teeth show marked degree malocclusio    • Tobacco dependence syndrome    • Type 2 diabetes mellitus (CMS/HCC)    • Vitamin D deficiency, unspecified        Allergies   Allergen Reactions   • Darvon [Propoxyphene] Swelling   • Meperidine And Related    • Morphine And Related GI Intolerance     Pt states IV is ok, PO makes him nauseous.   • Other      Darvocet       Past Surgical History:   Procedure Laterality Date   • BACK SURGERY     • COLONOSCOPY      sm.sessile polyp in anamika.colon,sigmoid,rectum,a few diverticula in sig.,hemorrhoids   • ENDOSCOPY      nonerosive gastritis of antrum,biopsy obtained,reflex disease   • KNEE SURGERY      left meniscectomy   • LUMBAR LAMINECTOMY     • SHOULDER SURGERY      open reduction of right shoulder fracture Done at [facility] by Dr Zurita       Family History   Problem Relation Age of Onset   • Coronary artery disease Other    • Drug abuse Other    • Heart disease Other    • Hypertension Other    • Stroke Other        Social History     Social History   • Marital status:      Social History Main Topics   • Smoking status: Former Smoker   • Smokeless tobacco: Never Used   • Alcohol use No   • Drug use: No   • Sexual activity: Defer     Other Topics Concern   • Not on file           Objective   Physical Exam   Constitutional: He is oriented to person, place, and time. He appears well-developed and well-nourished.   HENT:   Head: Normocephalic and atraumatic.   Mouth/Throat: Oropharynx is clear and moist.   Eyes: Pupils are equal, round, and reactive to light.   Neck: Neck supple. No JVD present. No tracheal deviation present.   Cardiovascular: Normal rate, regular rhythm, normal heart sounds and intact distal pulses.  Exam reveals no gallop and no friction rub.    No murmur heard.  Pulmonary/Chest: Effort normal and breath sounds normal. He has no rales.   Abdominal: Soft. Normal appearance and bowel sounds are normal. There is generalized tenderness. There is no  rigidity, no rebound, no guarding, no CVA tenderness, no tenderness at McBurney's point and negative Brothers's sign. No hernia. Hernia confirmed negative in the right inguinal area and confirmed negative in the left inguinal area.   Genitourinary: Rectum normal. Rectal exam shows guaiac negative stool.   Musculoskeletal: He exhibits no edema.   Lymphadenopathy:     He has no cervical adenopathy.   Neurological: He is alert and oriented to person, place, and time.   Skin: Skin is warm and dry.   Nursing note and vitals reviewed.      ECG 12 Lead    Date/Time: 8/20/2018 9:36 AM  Performed by: HANNA LE  Authorized by: HANNA LE   Interpreted by physician  Rhythm: sinus rhythm  Rate: normal  BPM: 89  QRS axis: normal  Conduction: 1st degree  ST Segments: ST segments normal  T Waves: T waves normal  Other: no other findings  Clinical impression: non-specific ECG                 ED Course  ED Course as of Aug 20 1241   Mon Aug 20, 2018   1240 Repeat bp 194/94. Pt resting comfortably.  [SD]      ED Course User Index  [SD] Hanna Le MD      Labs Reviewed   COMPREHENSIVE METABOLIC PANEL - Abnormal; Notable for the following:        Result Value    Glucose 333 (*)     BUN 34 (*)     Creatinine 2.05 (*)     Sodium 134 (*)     CO2 16.0 (*)     ALT (SGPT) 17 (*)     eGFR Non  Amer 32 (*)     All other components within normal limits    Narrative:     The MDRD GFR formula is only valid for adults with stable renal function between ages 18 and 70.   URINALYSIS W/ MICROSCOPIC IF INDICATED (NO CULTURE) - Abnormal; Notable for the following:     Glucose, UA >=1000 mg/dL (3+) (*)     Protein, UA >=300 mg/dL (3+) (*)     All other components within normal limits   CBC WITH AUTO DIFFERENTIAL - Abnormal; Notable for the following:     WBC 17.14 (*)     RBC 3.70 (*)     Hemoglobin 12.0 (*)     Hematocrit 33.3 (*)     Neutrophil % 80.1 (*)     Lymphocyte % 8.4 (*)     Neutrophils, Absolute 13.74 (*)     Monocytes,  Absolute 1.66 (*)     Immature Grans, Absolute 0.07 (*)     All other components within normal limits   AMYLASE - Abnormal; Notable for the following:     Amylase 38 (*)     All other components within normal limits   URINALYSIS, MICROSCOPIC ONLY - Abnormal; Notable for the following:     RBC, UA 3-5 (*)     All other components within normal limits   LIPASE - Normal   LACTIC ACID, PLASMA - Normal   BLOOD CULTURE   BLOOD CULTURE   RAINBOW DRAW    Narrative:     The following orders were created for panel order Barto Draw.  Procedure                               Abnormality         Status                     ---------                               -----------         ------                     Light Blue Top[[id number]]                                   Final result               Green Top (Gel)[[id number]]                                  Final result               Lavender Top[[id number]]                                     Final result               Gold Top - SST[[id number]]                                   Final result                 Please view results for these tests on the individual orders.   CBC AND DIFFERENTIAL    Narrative:     The following orders were created for panel order CBC & Differential.  Procedure                               Abnormality         Status                     ---------                               -----------         ------                     CBC Auto Differential[[id number]]        Abnormal            Final result                 Please view results for these tests on the individual orders.   LIGHT BLUE TOP   GREEN TOP   LAVENDER TOP   GOLD TOP - SST   Ct Abdomen Pelvis Without Contrast    Result Date: 8/20/2018  Narrative: PROCEDURE: Ct abdomen and pelvis without contrast REASON FOR EXAM: vomiting FINDINGS: Comparison study dated  May 4, 2014. Axial computer tomography sequential imaging was performed from the diaphragms through the symphysis pubis without IV contrast  administration. Sagittal and coronal reformates was performed. This exam was performed according to our departmental dose optimization program, which includes automated exposure control, adjustment of the mA and/or KV according to patient size and/or use of iterative reconstruction technique.  Small paraseptal blebs are seen involving left lung base. Left upper lobe anterior segment perihilar as well as bibasilar superior segment small patchy opacities. Very small bilateral pleural effusions. The liver is normal. The gallbladder is normal. The biliary system is normal. The pancreas is normal. The spleen is normal. Bilateral adrenal glands are normal. Right kidney and ureter are normal. Left kidney and ureter are normal. The bladder is normal. The prostate is normal. The hollow viscera is normal. The appendix is identified appears normal. No lymphadenopathy in the abdomen or pelvis. Atherosclerotic vascular calcifications of the abdominal aorta. No acute osseous abnormality.     Impression: 1. Left upper lobe anterior segment perihilar as well as bibasilar superior segment small patchy opacities suspicious for multifocal pneumonia.. 2. Very small bilateral pleural effusions.. 3. Otherwise unremarkable unenhanced CT abdomen pelvis study.. Electronically signed by:  Chuck Cary MD  8/20/2018 12:22 PM CDT Workstation: [id number]    Ct Head Without Contrast    Result Date: 8/20/2018  Narrative: Headaches. CT, head without intravenous contrast. Comparison is made with CT dated August 6, 2016. Radiation dose-limiting techniques also utilized, including automated exposure control and adjustment of mA and/or KVP to the patient size according to ALARA (as low as reasonably achievable). There is mild atrophy. The ventricles are normal in size. No midline shift is identified. There are mild periventricular and white matter hypodensities. There is no evidence of acute ischemia, intracranial hemorrhage or mass. No acute abnormality  is seen in the posterior fossa. No pituitary lesion is identified. There is mucosal thickening in the right maxillary sinus. The calvarium is intact.     Impression: CONCLUSION: 1. No acute intracranial abnormality is identified. 2. Mild atrophy and mild microvascular changes. Electronically signed by:  Segundo Prasad MD  8/20/2018 11:45 AM CDT Workstation: [id number]    Xr Chest 1 View    Result Date: 8/20/2018  Narrative: PROCEDURE: Single chest view AP REASON FOR EXAM:htn FINDINGS: Comparison exam dated May 31, 2014. Cardiac and pulmonary vasculature are normal. Right upper lobe small patchy opacity. Lungs are otherwise clear. Pleural spaces are normal. No acute osseous abnormality. Left clavicle mid shaft old healed fracture.     Impression: 1.  Right upper lobe small patchy opacity suspicious for pneumonia. Recommend follow chest x-ray in 7-10 days to document improvement and/or resolution. Electronically signed by:  Chuck Cary MD  8/20/2018 10:22 AM CDT Workstation: [id number]                MDM      Final diagnoses:   Pneumonia of right upper lobe due to infectious organism (CMS/[facility])   Hypertensive urgency   Chronic kidney disease, unspecified CKD stage            Evin Cosby MD  08/20/18 1238       Evin Cosby MD  08/20/18 1241

## 2018-08-21 NOTE — PLAN OF CARE
Problem: Patient Care Overview  Goal: Plan of Care Review  Outcome: Ongoing (interventions implemented as appropriate)   08/21/18 6971   Coping/Psychosocial   Plan of Care Reviewed With patient   Plan of Care Review   Progress improving   OTHER   Outcome Summary BP much better. discussed with MD adjusting BP medications. advanced to ADA heart healthy diet. no nausea of vomitting this shift.      Goal: Individualization and Mutuality  Outcome: Ongoing (interventions implemented as appropriate)    Goal: Discharge Needs Assessment  Outcome: Ongoing (interventions implemented as appropriate)    Goal: Interprofessional Rounds/Family Conf  Outcome: Ongoing (interventions implemented as appropriate)      Problem: Pneumonia (Adult)  Goal: Signs and Symptoms of Listed Potential Problems Will be Absent, Minimized or Managed (Pneumonia)  Outcome: Ongoing (interventions implemented as appropriate)      Problem: Pain, Chronic (Adult)  Goal: Identify Related Risk Factors and Signs and Symptoms  Outcome: Ongoing (interventions implemented as appropriate)    Goal: Acceptable Pain/Comfort Level and Functional Ability  Outcome: Ongoing (interventions implemented as appropriate)

## 2018-08-21 NOTE — NURSING NOTE
Pt B/P remains stable trough out night, Pt Clonidine 0.3 mg held at 0900. Notified Dr Fortune of Pt current B/P 127/61. Ordered to continue to check B/P, but hold last dose of Clonidine. If B/P starts trending up, Notify Dr Fortune

## 2018-08-21 NOTE — PROGRESS NOTES
[facility] Medicine Services  INPATIENT PROGRESS NOTE    Length of Stay: 1  Date of Admission: 8/20/2018  Primary Care Physician: Provider, No Known    Subjective   Chief Complaint:  Nausea and vomiting  HPI:  Patient states that he doesn't have any nausea this morning.  He states that his shortness of breath is significantly better.  He is hungry.    Review of Systems   Constitutional: Negative for appetite change, chills, fatigue and fever.   Respiratory: Positive for cough and shortness of breath. Negative for chest tightness.    Cardiovascular: Negative for chest pain, palpitations and leg swelling.   Gastrointestinal: Positive for nausea and vomiting. Negative for abdominal pain, constipation and diarrhea.   Skin: Negative for wound.   Neurological: Negative for dizziness, weakness, light-headedness, numbness and headaches.        All pertinent negatives and positives are as above. All other systems have been reviewed and are negative unless otherwise stated.     Objective    Temp:  [97.4 °F (36.3 °C)-98.6 °F (37 °C)] 98.6 °F (37 °C)  Heart Rate:  [] 61  Resp:  [18-21] 20  BP: (126-202)/() 147/65    Physical Exam   Constitutional: He appears well-developed and well-nourished.   HENT:   Head: Normocephalic and atraumatic.   Eyes: Pupils are equal, round, and reactive to light. EOM are normal.   Neck: Normal range of motion. Neck supple.   Cardiovascular: Normal rate, regular rhythm, normal heart sounds and intact distal pulses.  Exam reveals no gallop and no friction rub.    No murmur heard.  Pulmonary/Chest: Effort normal. No respiratory distress. He has decreased breath sounds. He has no wheezes. He has no rales. He exhibits no tenderness.   Abdominal: Soft. Bowel sounds are normal. He exhibits no distension. There is no tenderness.   Psychiatric: He has a normal mood and affect. His behavior is normal.   Vitals reviewed.          Results Review:  I have  reviewed the labs, radiology results, and diagnostic studies.    Laboratory Data:     Results from last 7 days  Lab Units 08/21/18  0342 08/20/18  0856   SODIUM mmol/L 134* 134*   POTASSIUM mmol/L 4.4 5.0   CHLORIDE mmol/L 107 103   CO2 mmol/L 19.0* 16.0*   BUN mg/dL 34* 34*   CREATININE mg/dL 2.12* 2.05*   GLUCOSE mg/dL 194* 333*   CALCIUM mg/dL 8.2* 9.4   BILIRUBIN mg/dL  --  0.9   ALK PHOS U/L  --  73   ALT (SGPT) U/L  --  17*   AST (SGOT) U/L  --  26   ANION GAP mmol/L 8.0 15.0     Estimated Creatinine Clearance: 39.1 mL/min (A) (by C-G formula based on SCr of 2.12 mg/dL (H)).            Results from last 7 days  Lab Units 08/21/18  0428 08/20/18  0856   WBC 10*3/mm3 11.90* 17.14*   HEMOGLOBIN g/dL 10.3* 12.0*   HEMATOCRIT % 28.9* 33.3*   PLATELETS 10*3/mm3 252 263           Culture Data:   Blood Culture   Date Value Ref Range Status   08/20/2018 No growth at 24 hours  Preliminary   08/20/2018 No growth at 24 hours  Preliminary     No results found for: URINECX  Respiratory Culture   Date Value Ref Range Status   08/20/2018 Normal Respiratory Carleen  Preliminary     No results found for: WOUNDCX  No results found for: STOOLCX  No components found for: BODYFLD    Radiology Data:   Imaging Results (last 24 hours)     Procedure Component Value Units Date/Time    CT Abdomen Pelvis Without Contrast [[id number]] Collected:  08/20/18 1111     Updated:  08/20/18 1223    Narrative:         PROCEDURE: Ct abdomen and pelvis without contrast    REASON FOR EXAM: vomiting    FINDINGS: Comparison study dated  May 4, 2014. Axial computer  tomography sequential imaging was performed from the diaphragms  through the symphysis pubis without IV contrast administration.  Sagittal and coronal reformates was performed. This exam was  performed according to our departmental dose optimization  program, which includes automated exposure control, adjustment of  the mA and/or KV according to patient size and/or use of  iterative  reconstruction technique.     Small paraseptal blebs are seen involving left lung base. Left  upper lobe anterior segment perihilar as well as bibasilar  superior segment small patchy opacities. Very small bilateral  pleural effusions.    The liver is normal. The gallbladder is normal. The biliary  system is normal. The pancreas is normal. The spleen is normal.  Bilateral adrenal glands are normal. Right kidney and ureter are  normal. Left kidney and ureter are normal. The bladder is normal.  The prostate is normal. The hollow viscera is normal. The  appendix is identified appears normal. No lymphadenopathy in the  abdomen or pelvis. Atherosclerotic vascular calcifications of the  abdominal aorta. No acute osseous abnormality.      Impression:       1. Left upper lobe anterior segment perihilar as well as  bibasilar superior segment small patchy opacities suspicious for  multifocal pneumonia..  2. Very small bilateral pleural effusions..  3. Otherwise unremarkable unenhanced CT abdomen pelvis study..    Electronically signed by:  Chuck Cary MD  8/20/2018 12:22 PM CDT  Workstation: [id number]    CT Head Without Contrast [[id number]] Collected:  08/20/18 1109     Updated:  08/20/18 1146    Narrative:       Headaches.    CT, head without intravenous contrast.    Comparison is made with CT dated August 6, 2016.    Radiation dose-limiting techniques also utilized, including  automated exposure control and adjustment of mA and/or KVP to the  patient size according to ALARA (as low as reasonably  achievable).    There is mild atrophy. The ventricles are normal in size. No  midline shift is identified. There are mild periventricular and  white matter hypodensities. There is no evidence of acute  ischemia, intracranial hemorrhage or mass. No acute abnormality  is seen in the posterior fossa. No pituitary lesion is  identified.    There is mucosal thickening in the right maxillary sinus. The  calvarium is intact.      Impression:        CONCLUSION:  1. No acute intracranial abnormality is identified.  2. Mild atrophy and mild microvascular changes.    Electronically signed by:  Segundo Prasad MD  8/20/2018 11:45  AM CDT Workstation: [id number]          I have reviewed the patient's current medications.     Assessment/Plan     Hospital Problem List     Diabetes mellitus type 2, insulin dependent (CMS/HCC)    Hypertensive urgency    Pneumonia of right upper lobe due to infectious organism (CMS/HCC)    Leukocytosis    Intractable vomiting with nausea    Generalized abdominal pain          Plan:    1.  Right upper lobe pneumonia, likely due to aspiration: Continue antibiotics.  Cultures pending.  2.  Nausea and vomiting: Much better today.  3.  Uncontrolled hypertension: Better after resolution of nausea.  4.  Diabetes mellitus              Discharge Planning: I expect patient to be discharged to home in 3-4 days.        This document has been electronically signed by Jake Benson MD on August 21, 2018 11:16 AM

## 2018-08-21 NOTE — PROGRESS NOTES
Discharge Planning Assessment  [facility]     Patient Name: Dustin Moulton  MRN: [id number]  Today's Date: 8/21/2018    Admit Date: 8/20/2018          Discharge Needs Assessment     Row Name 08/21/18 1219       Living Environment    Lives With alone    Current Living Arrangements home/apartment/condo    Primary Care Provided by self    Provides Primary Care For no one    Family Caregiver if Needed child(nancy), adult    Quality of Family Relationships supportive    Able to Return to Prior Arrangements yes    Living Arrangement Comments Pt resides at home alone. Pt informed LSW that his son resides close and offers assistance.        Resource/Environmental Concerns    Resource/Environmental Concerns none    Transportation Concerns car, none       Transition Planning    Patient/Family Anticipates Transition to home    Patient/Family Anticipated Services at Transition home health care    Transportation Anticipated family or friend will provide       Discharge Needs Assessment    Concerns to be Addressed adjustment to diagnosis/illness    Equipment Currently Used at Home none    Discharge Facility/Level of Care Needs home with home health    Current Discharge Risk chronically ill;lives alone    Discharge Coordination/Progress Pt may benefit from home health follow up. Pt is agreeable if MD recomends.            Discharge Plan     Row Name 08/21/18 1225       Plan    Plan Comments LSW assesment complete. pt resides at home alone. pt reports good support system. Pt informed LSW that he was independent prior to hospitalization. pt has used home health in past no services currently. pt agreeable to home health follow up if recomended by MD. LSW asked about his PCP, pt informed LSW that he see's a APRN in [place] but could not recall her name. LSW awaiting additional recomendations from MD and therapy. LSW/case mgt will follow up as consulted and complete arrangements as ordered.         Destination     No service  coordination in this encounter.      Durable Medical Equipment     No service coordination in this encounter.      Dialysis/Infusion     No service coordination in this encounter.      Home Medical Care     No service coordination in this encounter.      Social Care     No service coordination in this encounter.        Expected Discharge Date and Time     Expected Discharge Date Expected Discharge Time    Aug 23, 2018               Demographic Summary     Row Name 08/21/18 1218       General Information    Admission Type inpatient    Referral Source high risk screening    Reason for Consult discharge planning    Preferred Language English     Used During This Interaction no       Contact Information    Contact Information Obtained for             Functional Status     Row Name 08/21/18 1218       Functional Status    Usual Activity Tolerance moderate    Current Activity Tolerance fair       Functional Status, IADL    Medications independent    Meal Preparation independent    Housekeeping independent    Laundry independent    Shopping independent    IADL Comments Pt informed LSW that he was not a good cook or  but he manages.        Mental Status Summary    Recent Changes in Mental Status/Cognitive Functioning no changes            Psychosocial    No documentation.           Abuse/Neglect    No documentation.           Legal    No documentation.           Substance Abuse    No documentation.           Patient Forms    No documentation.         LEVAR Watters

## 2018-08-21 NOTE — PLAN OF CARE
Problem: Patient Care Overview  Goal: Plan of Care Review  Outcome: Ongoing (interventions implemented as appropriate)   08/21/18 0542   Coping/Psychosocial   Plan of Care Reviewed With patient   Plan of Care Review   Progress improving   OTHER   Outcome Summary Pt VSS, No C/O SOA, Pain controlled with meds, See MAR       Problem: Pneumonia (Adult)  Goal: Signs and Symptoms of Listed Potential Problems Will be Absent, Minimized or Managed (Pneumonia)  Outcome: Ongoing (interventions implemented as appropriate)      Problem: Pain, Chronic (Adult)  Goal: Identify Related Risk Factors and Signs and Symptoms  Outcome: Ongoing (interventions implemented as appropriate)    Goal: Acceptable Pain/Comfort Level and Functional Ability  Outcome: Ongoing (interventions implemented as appropriate)

## 2018-08-22 NOTE — PLAN OF CARE
Problem: Patient Care Overview  Goal: Plan of Care Review  Outcome: Ongoing (interventions implemented as appropriate)   08/22/18 6299   Coping/Psychosocial   Plan of Care Reviewed With patient   Plan of Care Review   Progress improving   OTHER   Outcome Summary vss, BP getting better with taking meds, denies SOA, ambulating in room       Problem: Pneumonia (Adult)  Goal: Signs and Symptoms of Listed Potential Problems Will be Absent, Minimized or Managed (Pneumonia)  Outcome: Ongoing (interventions implemented as appropriate)      Problem: Pain, Chronic (Adult)  Goal: Identify Related Risk Factors and Signs and Symptoms  Outcome: Ongoing (interventions implemented as appropriate)    Goal: Acceptable Pain/Comfort Level and Functional Ability  Outcome: Ongoing (interventions implemented as appropriate)

## 2018-08-22 NOTE — PLAN OF CARE
Problem: Patient Care Overview  Goal: Plan of Care Review  Outcome: Ongoing (interventions implemented as appropriate)   08/22/18 9484   Coping/Psychosocial   Plan of Care Reviewed With patient   Plan of Care Review   Progress improving   OTHER   Outcome Summary Pt pain controlled, Pt denies SOA, VSS       Problem: Pneumonia (Adult)  Goal: Signs and Symptoms of Listed Potential Problems Will be Absent, Minimized or Managed (Pneumonia)  Outcome: Ongoing (interventions implemented as appropriate)      Problem: Pain, Chronic (Adult)  Goal: Identify Related Risk Factors and Signs and Symptoms  Outcome: Ongoing (interventions implemented as appropriate)    Goal: Acceptable Pain/Comfort Level and Functional Ability  Outcome: Ongoing (interventions implemented as appropriate)

## 2018-08-22 NOTE — PROGRESS NOTES
[facility] Medicine Services  INPATIENT PROGRESS NOTE    Length of Stay: 2  Date of Admission: 8/20/2018  Primary Care Physician: Provider, No Known    Subjective   Chief Complaint:  Nausea and vomiting  HPI:  Patient is without complaint today.  His mentation is much clearer today.  He states that in the past he has had home health services come to his home and arrange his medications for him.  He was much more compliant with his medications at that point, and would like for home health to return.    Review of Systems   Constitutional: Negative for appetite change, chills, fatigue and fever.   Respiratory: Positive for cough and shortness of breath. Negative for chest tightness.    Cardiovascular: Negative for chest pain, palpitations and leg swelling.   Gastrointestinal: Positive for nausea and vomiting. Negative for abdominal pain, constipation and diarrhea.   Skin: Negative for wound.   Neurological: Negative for dizziness, weakness, light-headedness, numbness and headaches.        All pertinent negatives and positives are as above. All other systems have been reviewed and are negative unless otherwise stated.     Objective    Temp:  [97.6 °F (36.4 °C)-99 °F (37.2 °C)] 97.6 °F (36.4 °C)  Heart Rate:  [60-94] 85  Resp:  [16-18] 16  BP: (112-210)/() 157/72    Physical Exam   Constitutional: He appears well-developed and well-nourished.   HENT:   Head: Normocephalic and atraumatic.   Eyes: Pupils are equal, round, and reactive to light. EOM are normal.   Neck: Normal range of motion. Neck supple.   Cardiovascular: Normal rate, regular rhythm, normal heart sounds and intact distal pulses.  Exam reveals no gallop and no friction rub.    No murmur heard.  Pulmonary/Chest: Effort normal. No respiratory distress. He has decreased breath sounds. He has no wheezes. He has no rales. He exhibits no tenderness.   Abdominal: Soft. Bowel sounds are normal. He exhibits no  distension. There is no tenderness.   Psychiatric: He has a normal mood and affect. His behavior is normal.   Vitals reviewed.          Results Review:  I have reviewed the labs, radiology results, and diagnostic studies.    Laboratory Data:     Results from last 7 days  Lab Units 08/22/18  0332 08/21/18  0342 08/20/18  0856   SODIUM mmol/L 135* 134* 134*   POTASSIUM mmol/L 4.0 4.4 5.0   CHLORIDE mmol/L 107 107 103   CO2 mmol/L 21.0* 19.0* 16.0*   BUN mg/dL 31* 34* 34*   CREATININE mg/dL 2.18* 2.12* 2.05*   GLUCOSE mg/dL 110* 194* 333*   CALCIUM mg/dL 8.4 8.2* 9.4   BILIRUBIN mg/dL  --   --  0.9   ALK PHOS U/L  --   --  73   ALT (SGPT) U/L  --   --  17*   AST (SGOT) U/L  --   --  26   ANION GAP mmol/L 7.0 8.0 15.0     Estimated Creatinine Clearance: 38 mL/min (A) (by C-G formula based on SCr of 2.18 mg/dL (H)).            Results from last 7 days  Lab Units 08/22/18  0332 08/21/18  0428 08/20/18  0856   WBC 10*3/mm3 9.72 11.90* 17.14*   HEMOGLOBIN g/dL 10.0* 10.3* 12.0*   HEMATOCRIT % 27.5* 28.9* 33.3*   PLATELETS 10*3/mm3 236 252 263           Culture Data:   Blood Culture   Date Value Ref Range Status   08/20/2018 Abnormal Stain (A)  Preliminary   08/20/2018 No growth at 2 days  Preliminary     No results found for: URINECX  Respiratory Culture   Date Value Ref Range Status   08/20/2018 Normal Respiratory Carleen  Final     No results found for: WOUNDCX  No results found for: STOOLCX  No components found for: BODYFLD    Radiology Data:   Imaging Results (last 24 hours)     ** No results found for the last 24 hours. **          I have reviewed the patient's current medications.     Assessment/Plan     Hospital Problem List     Diabetes mellitus type 2, insulin dependent (CMS/HCC)    Hypertensive urgency    Pneumonia of right upper lobe due to infectious organism (CMS/HCC)    Leukocytosis    Intractable vomiting with nausea    Generalized abdominal pain          Plan:    1.  Right upper lobe pneumonia, likely due to  aspiration: Continue antibiotics.  Cultures pending.  2.  Nausea and vomiting: Much better today.  3.  Uncontrolled hypertension: Better after resolution of nausea.  4.  Diabetes mellitus              Discharge Planning: I expect patient to be discharged to home in 2-3 days.  -      This document has been electronically signed by Jake Benson MD on August 22, 2018 2:25 PM

## 2018-08-23 NOTE — PROGRESS NOTES
[facility] Medicine Services  INPATIENT PROGRESS NOTE    Length of Stay: 3  Date of Admission: 8/20/2018  Primary Care Physician: Provider, No Known    Subjective   Chief Complaint: N/V  HPI:  Denies any sob, N/V.  Tolerating po    Review of Systems   Respiratory: Negative for shortness of breath.    Cardiovascular: Negative for chest pain.   Gastrointestinal: Negative for abdominal pain.        All pertinent negatives and positives are as above. All other systems have been reviewed and are negative unless otherwise stated.     Objective    Temp:  [97.6 °F (36.4 °C)-99.7 °F (37.6 °C)] 97.6 °F (36.4 °C)  Heart Rate:  [51-94] 62  Resp:  [16-18] 16  BP: (146-168)/(67-87) 156/78    Physical Exam   Constitutional: He appears well-developed.   HENT:   Head: Normocephalic and atraumatic.   Eyes: Pupils are equal, round, and reactive to light.   Neck: Normal range of motion.   Cardiovascular: Normal rate.    Pulmonary/Chest: He has decreased breath sounds. He has no wheezes.   Abdominal: Soft. There is no tenderness.   Musculoskeletal: He exhibits no edema.   Neurological: He is alert.   Skin: Skin is warm and dry.   Psychiatric: He has a normal mood and affect.           Results Review:  I have reviewed the labs, radiology results, and diagnostic studies.    Laboratory Data:     Results from last 7 days  Lab Units 08/23/18  0642 08/22/18  0332 08/21/18  0342 08/20/18  0856   SODIUM mmol/L 133* 135* 134* 134*   POTASSIUM mmol/L 4.1 4.0 4.4 5.0   CHLORIDE mmol/L 104 107 107 103   CO2 mmol/L 20.0* 21.0* 19.0* 16.0*   BUN mg/dL 34* 31* 34* 34*   CREATININE mg/dL 2.21* 2.18* 2.12* 2.05*   GLUCOSE mg/dL 220* 110* 194* 333*   CALCIUM mg/dL 8.1* 8.4 8.2* 9.4   BILIRUBIN mg/dL  --   --   --  0.9   ALK PHOS U/L  --   --   --  73   ALT (SGPT) U/L  --   --   --  17*   AST (SGOT) U/L  --   --   --  26   ANION GAP mmol/L 9.0 7.0 8.0 15.0     Estimated Creatinine Clearance: 37.5 mL/min (A) (by  C-G formula based on SCr of 2.21 mg/dL (H)).            Results from last 7 days  Lab Units 08/23/18  0611 08/22/18  0332 08/21/18  0428 08/20/18  0856   WBC 10*3/mm3 9.17 9.72 11.90* 17.14*   HEMOGLOBIN g/dL 9.3* 10.0* 10.3* 12.0*   HEMATOCRIT % 26.8* 27.5* 28.9* 33.3*   PLATELETS 10*3/mm3 236 236 252 263           Culture Data:   No results found for: BLOODCX  No results found for: URINECX  No results found for: RESPCX  No results found for: WOUNDCX  No results found for: STOOLCX  No components found for: BODYFLD    Radiology Data:   Imaging Results (last 24 hours)     ** No results found for the last 24 hours. **          I have reviewed the patient's current medications.     Assessment/Plan     Hospital Problem List     Diabetes mellitus type 2, insulin dependent (CMS/HCC)    Hypertensive urgency    Pneumonia of right upper lobe due to infectious organism (CMS/HCC)    Leukocytosis    Intractable vomiting with nausea    Generalized abdominal pain          Plan:    1. RUL pna: improved.  Likely 2/2 aspiration.  Cont zosyn  2. Bradycardia: decrease coreg.  Cont other meds  3. CKD: slightly worse. Likely prerenal.  Hydrate and recheck in am.   4. HTN: borderline.  Decrease BB 2/2 bradycardia  5. IDDM: borderline. Increase long acting to 35.  Cont SSI    Signed     Dr Saravanan Stark,    8/23/2018  5:05 PM

## 2018-08-23 NOTE — PLAN OF CARE
Problem: Patient Care Overview  Goal: Plan of Care Review  Outcome: Ongoing (interventions implemented as appropriate)   08/23/18 0338   Coping/Psychosocial   Plan of Care Reviewed With patient   Plan of Care Review   Progress improving   OTHER   Outcome Summary elevated blood pressure, showind improvement with medications, no respiratory compromise noted, ambulating independently in room, elevated blood sugar; will continue to monitor     Goal: Individualization and Mutuality  Outcome: Ongoing (interventions implemented as appropriate)    Goal: Discharge Needs Assessment  Outcome: Ongoing (interventions implemented as appropriate)    Goal: Interprofessional Rounds/Family Conf  Outcome: Ongoing (interventions implemented as appropriate)      Problem: Pneumonia (Adult)  Goal: Signs and Symptoms of Listed Potential Problems Will be Absent, Minimized or Managed (Pneumonia)  Outcome: Ongoing (interventions implemented as appropriate)      Problem: Pain, Chronic (Adult)  Goal: Identify Related Risk Factors and Signs and Symptoms  Outcome: Ongoing (interventions implemented as appropriate)    Goal: Acceptable Pain/Comfort Level and Functional Ability  Outcome: Ongoing (interventions implemented as appropriate)

## 2018-08-23 NOTE — PLAN OF CARE
Problem: Patient Care Overview  Goal: Plan of Care Review  Outcome: Ongoing (interventions implemented as appropriate)   08/23/18 4544   Coping/Psychosocial   Plan of Care Reviewed With patient   Plan of Care Review   Progress improving   OTHER   Outcome Summary vss, Hr a little mp so had to hold a few BP meds but will closely monitor BP, no pain, montiori glucose        Problem: Pneumonia (Adult)  Goal: Signs and Symptoms of Listed Potential Problems Will be Absent, Minimized or Managed (Pneumonia)  Outcome: Ongoing (interventions implemented as appropriate)      Problem: Pain, Chronic (Adult)  Goal: Identify Related Risk Factors and Signs and Symptoms  Outcome: Outcome(s) achieved Date Met: 08/23/18    Goal: Acceptable Pain/Comfort Level and Functional Ability  Outcome: Ongoing (interventions implemented as appropriate)

## 2018-08-24 NOTE — PROGRESS NOTES
[facility] Medicine Services  INPATIENT PROGRESS NOTE    Length of Stay: 4  Date of Admission: 8/20/2018  Primary Care Physician: Genaro Light MD    Subjective   Chief Complaint: N/V  HPI: denies any sx.  Wants to go home.     Review of Systems   Respiratory: Negative for shortness of breath.    Cardiovascular: Negative for chest pain.   Gastrointestinal: Negative for abdominal pain.        All pertinent negatives and positives are as above. All other systems have been reviewed and are negative unless otherwise stated.     Objective    Temp:  [96.3 °F (35.7 °C)-98.3 °F (36.8 °C)] 96.3 °F (35.7 °C)  Heart Rate:  [50-63] 50  Resp:  [16-18] 18  BP: (142-176)/(67-84) 142/80    Physical Exam   Constitutional: He appears well-developed.   HENT:   Head: Normocephalic and atraumatic.   Eyes: Pupils are equal, round, and reactive to light.   Neck: Normal range of motion.   Cardiovascular: Normal rate.    Pulmonary/Chest: He has decreased breath sounds. He has no wheezes.   Abdominal: Soft. There is no tenderness.   Musculoskeletal: He exhibits no edema.   Neurological: He is alert.   Skin: Skin is warm and dry.   Psychiatric: He has a normal mood and affect.           Results Review:  I have reviewed the labs, radiology results, and diagnostic studies.    Laboratory Data:     Results from last 7 days  Lab Units 08/24/18  0633 08/23/18  0642 08/22/18  0332  08/20/18  0856   SODIUM mmol/L 134* 133* 135*  < > 134*   POTASSIUM mmol/L 4.0 4.1 4.0  < > 5.0   CHLORIDE mmol/L 105 104 107  < > 103   CO2 mmol/L 20.0* 20.0* 21.0*  < > 16.0*   BUN mg/dL 33* 34* 31*  < > 34*   CREATININE mg/dL 2.09* 2.21* 2.18*  < > 2.05*   GLUCOSE mg/dL 105* 220* 110*  < > 333*   CALCIUM mg/dL 8.5 8.1* 8.4  < > 9.4   BILIRUBIN mg/dL  --   --   --   --  0.9   ALK PHOS U/L  --   --   --   --  73   ALT (SGPT) U/L  --   --   --   --  17*   AST (SGOT) U/L  --   --   --   --  26   ANION GAP mmol/L 9.0 9.0 7.0   < > 15.0   < > = values in this interval not displayed.  Estimated Creatinine Clearance: 39.6 mL/min (A) (by C-G formula based on SCr of 2.09 mg/dL (H)).            Results from last 7 days  Lab Units 08/24/18  0633 08/23/18  0611 08/22/18  0332 08/21/18  0428 08/20/18  0856   WBC 10*3/mm3 10.45* 9.17 9.72 11.90* 17.14*   HEMOGLOBIN g/dL 10.2* 9.3* 10.0* 10.3* 12.0*   HEMATOCRIT % 27.4* 26.8* 27.5* 28.9* 33.3*   PLATELETS 10*3/mm3 249 236 236 252 263           Culture Data:   No results found for: BLOODCX  No results found for: URINECX  No results found for: RESPCX  No results found for: WOUNDCX  No results found for: STOOLCX  No components found for: BODYFLD    Radiology Data:   Imaging Results (last 24 hours)     ** No results found for the last 24 hours. **          I have reviewed the patient's current medications.     Assessment/Plan     Hospital Problem List     Diabetes mellitus type 2, insulin dependent (CMS/HCC)    Hypertensive urgency    Pneumonia of right upper lobe due to infectious organism (CMS/HCC)    Leukocytosis    Intractable vomiting with nausea    Generalized abdominal pain          Plan:    1. RUL pna: improved.  Likely 2/2 aspiration.  Cont zosyn  2. Bradycardia: stop coreg.  Cont clonidine for now  3. CKD: improved.    4. HTN: worse.  Stop BB.  On CCB, alpha blocker, hydralazine, clonidine.  Start low dose chlorthalidone.  Consult renal  5. IDDM: borderline.  Increase long acting to 35u    Signed     Dr Saravanan Stark,    8/24/2018  3:42 PM

## 2018-08-24 NOTE — PLAN OF CARE
Problem: Patient Care Overview  Goal: Plan of Care Review  Outcome: Ongoing (interventions implemented as appropriate)   08/24/18 0418   Coping/Psychosocial   Plan of Care Reviewed With patient   Plan of Care Review   Progress improving   OTHER   Outcome Summary vss, no complaints of pain, ambulating, tolerating diet, BP hasimproved, working toward lowering glucose     Goal: Individualization and Mutuality  Outcome: Ongoing (interventions implemented as appropriate)    Goal: Discharge Needs Assessment  Outcome: Ongoing (interventions implemented as appropriate)    Goal: Interprofessional Rounds/Family Conf  Outcome: Ongoing (interventions implemented as appropriate)      Problem: Pneumonia (Adult)  Goal: Signs and Symptoms of Listed Potential Problems Will be Absent, Minimized or Managed (Pneumonia)  Outcome: Ongoing (interventions implemented as appropriate)      Problem: Pain, Chronic (Adult)  Goal: Acceptable Pain/Comfort Level and Functional Ability  Outcome: Ongoing (interventions implemented as appropriate)

## 2018-08-24 NOTE — PLAN OF CARE
Problem: Patient Care Overview  Goal: Plan of Care Review  Outcome: Ongoing (interventions implemented as appropriate)   08/24/18 1821   Coping/Psychosocial   Plan of Care Reviewed With patient   Plan of Care Review   Progress no change   OTHER   Outcome Summary pt ambulating halls today, no c/o pain, BP remains elevated       Problem: Pneumonia (Adult)  Goal: Signs and Symptoms of Listed Potential Problems Will be Absent, Minimized or Managed (Pneumonia)  Outcome: Ongoing (interventions implemented as appropriate)      Problem: Pain, Chronic (Adult)  Goal: Acceptable Pain/Comfort Level and Functional Ability  Outcome: Ongoing (interventions implemented as appropriate)

## 2018-08-25 NOTE — DISCHARGE SUMMARY
[facility] Medicine Services  DISCHARGE SUMMARY       Date of Admission: 8/20/2018  Date of Discharge:  8/25/2018  Primary Care Physician: Genaro Light MD    Presenting Problem/History of Present Illness:  Hypertensive urgency [I16.0]  Pneumonia of right upper lobe due to infectious organism (CMS/HCC) [J18.1]  Chronic kidney disease, unspecified CKD stage [N18.9]       Final Discharge Diagnoses:  Hospital Problem List     Diabetes mellitus type 2, insulin dependent (CMS/HCC)    Hypertensive urgency    Pneumonia of right upper lobe due to infectious organism (CMS/HCC)    Leukocytosis    Intractable vomiting with nausea    Generalized abdominal pain          Consults:   Consults     Date and Time Order Name Status Description    8/25/2018 1125 Inpatient Nephrology Consult      8/20/2018 1238 Hospitalist (on-call MD unless specified)            Procedures Performed:                 Pertinent Test Results:   CT abd/pelvis:  1. Left upper lobe anterior segment perihilar as well as  bibasilar superior segment small patchy opacities suspicious for  multifocal pneumonia..  2. Very small bilateral pleural effusions..  3. Otherwise unremarkable unenhanced CT abdomen pelvis study..    CXR  1.  Right upper lobe small patchy opacity suspicious for  pneumonia. Recommend follow chest x-ray in 7-10 days to document  improvement and/or resolution.    Chief Complaint on Day of Discharge: none.  Denies nay sob, CP, abd pain, N/V, new weakness or any other concerning sx.    Hospital Course:  The patient is a 73 y.o. male who presented to [facility] with   From H&P: 73 year old male patient who came to the ED due to nauseas, vomiting and abdominal pain. Everything started yesterday, initially he started with nausea and vomiting, this last described as non bilious non bloody, more than 30 episodes, most recently just having dry heaves. Abdominal pain is described as  "diffused, constant, non radiated, 7/10 in severity, aggravated by meals, not alleviating factors. At the ED BP was elevated with criteria for hypertensive urgency , labs were remarkable for leukocytosis , hyperglycemia , CT chest/abdomen showed Left upper lobe anterior segment perihilar as well as bibasilar superior segment small patchy opacities suspicious for multifocal pneumonia. He was admitted recently at another facility for same presentation.    Hospital course: Pt was treated for pna with abx and sx got better.  N/V/abd pain has resolved.  For BP - had bradycardia; stopped coreg; started the pt on 12.5 mg chlorthalidone.  BP better on current regimen.  Will need close fu labs to make sure electrolytes as ok.  Nephrology saw the pt and ok with dc. Pt was discharged home in stable condition.    Condition on Discharge:  fair    Physical Exam on Discharge:  /68 (BP Location: Right arm, Patient Position: Sitting)   Pulse 52   Temp 97.6 °F (36.4 °C) (Oral)   Resp 18   Ht 182.9 cm (72.01\")   Wt 106 kg (234 lb 3.2 oz)   SpO2 98%   BMI 31.76 kg/m²   Physical Exam   Constitutional: He appears well-developed.   HENT:   Head: Normocephalic and atraumatic.   Eyes: Pupils are equal, round, and reactive to light.   Neck: Normal range of motion.   Cardiovascular: Normal rate.    Pulmonary/Chest: He has decreased breath sounds. He has no wheezes.   Abdominal: Soft. There is no tenderness.   Musculoskeletal: He exhibits no edema.   Neurological: He is alert.   Skin: Skin is warm and dry.   Psychiatric: He has a normal mood and affect.         Discharge Disposition:  Home or Self Care    Discharge Medications:     Discharge Medications      New Medications      Instructions Start Date   amoxicillin-clavulanate 500-125 MG per tablet  Commonly known as:  AUGMENTIN   1 tablet, Oral, 2 Times Daily      chlorthalidone 25 MG tablet  Commonly known as:  HYGROTON   12.5 mg, Oral, Daily      ondansetron 4 MG " tablet  Commonly known as:  ZOFRAN   4 mg, Oral, Every 6 Hours PRN      probiotic capsule capsule   1 capsule, Oral, 2 Times Daily, You may substitute with any cheaper version.  thx         Changes to Medications      Instructions Start Date   levothyroxine 25 MCG tablet  Commonly known as:  SYNTHROID, LEVOTHROID  What changed:  Another medication with the same name was removed. Continue taking this medication, and follow the directions you see here.   25 mcg, Oral, Daily         Continue These Medications      Instructions Start Date   albuterol 108 (90 Base) MCG/ACT inhaler  Commonly known as:  PROAIR HFA   2 puffs, Inhalation, 4 Times Daily - RT      amLODIPine 10 MG tablet  Commonly known as:  NORVASC   10 mg, Oral, Daily      aspirin 81 MG EC tablet   81 mg, Oral, Daily      atorvastatin 40 MG tablet  Commonly known as:  LIPITOR   TAKE 1 TABLET DAILY AT BEDTIME      baclofen 10 MG tablet  Commonly known as:  LIORESAL   5-10 mg, Oral, 3 Times Daily PRN, Take one-half to one tablet by mouth three times daily as needed for muscle spasms       budesonide-formoterol 160-4.5 MCG/ACT inhaler  Commonly known as:  SYMBICORT   2 puffs, Inhalation, 2 Times Daily - RT      cetirizine 5 MG tablet  Commonly known as:  zyrTEC   5 mg, Oral, Daily      citalopram 40 MG tablet  Commonly known as:  CeleXA   40 mg, Oral, Daily      clobetasol 0.05 % external solution  Commonly known as:  TEMOVATE   1 application, Topical, Daily, Apply topically to scalp daily       CloNIDine 0.3 MG tablet  Commonly known as:  CATAPRES   0.3 mg, Oral, 3 Times Daily      esomeprazole 40 MG capsule  Commonly known as:  nexIUM   40 mg, Oral, 2 Times Daily      fluticasone 50 MCG/ACT nasal spray  Commonly known as:  FLONASE   USE 2 SPRAYS IN EACH NOSTRIL DAILY.      hydrALAZINE 100 MG tablet  Commonly known as:  APRESOLINE   100 mg, Oral, Every 8 Hours      insulin aspart 100 UNIT/ML solution pen-injector sc pen  Commonly known as:  novoLOG FLEXPEN    20 Units, Subcutaneous, 3 Times Daily PRN      LANTUS 100 UNIT/ML injection  Generic drug:  insulin glargine   30 Units, Subcutaneous, Nightly      oxyCODONE-acetaminophen  MG per tablet  Commonly known as:  PERCOCET   1 tablet, Oral, Every 6 Hours PRN      Pen Needles 31G X 6 MM misc   Does not apply, As Directed       B-D UF III MINI PEN NEEDLES 31G X 5 MM misc  Generic drug:  Insulin Pen Needle   USE 4 TIMES DAILY.      tamsulosin 0.4 MG capsule 24 hr capsule  Commonly known as:  FLOMAX   1 capsule, Oral, Daily         Stop These Medications    carvedilol 6.25 MG tablet  Commonly known as:  COREG     metFORMIN 500 MG tablet  Commonly known as:  GLUCOPHAGE     sodium polystyrene 15 GM/60ML suspension  Commonly known as:  KAYEXALATE            Discharge Diet: diabetic    Activity at Discharge: ad stephan    Discharge Care Plan/Instructions: per staff    Follow-up Appointments:   Pcp in 1 week  Needs BMP in 2 days and sent to pcp  Renal in 1-2 weeks  Psych asap for anxiety    Future Appointments  Date Time Provider Department Center   9/4/2018 11:30 AM Genaro Light MD [facility] None       Test Results Pending at Discharge:     More than 30 min spent on discharge    Signed     Dr Saravanan Stark DO   8/25/2018  11:39 AM

## 2018-08-25 NOTE — NURSING NOTE
Pt family contacted me to inform me that he was unable to get to the pharmacy on time to  discharge prescriptions. He requested that they be called into [facility] in [place]. I called all of them into [facility] at 1520. Notified pt and family that they were called in, so they could pick them up there today before closing at 6 PM.

## 2018-08-25 NOTE — CONSULTS
[facility]  [address] [place] [address]. [address]    [phone]    [phone]     Consultation         PATIENT  DEMOGRAPHICS   PATIENT NAME: Dustin Moulton                      PHYSICIAN: Feli Ramirez MD  : 1945  MRN: [id number]    Subjective   SUBJECTIVE   Referring Provider: Dr Stark  Reason for Consultation: ckd 4 htn  History of present illness:      Mr. Moulton is a 73-year-old gentleman who has chronic kidney disease stage IV with baseline creatinine recently up to 2.0-2.1.  It was upto 2.6 2.7 range back in 2018.  At that time we have stopped the Demadex and given him as needed.  We have stopped the ACE inhibitor due to advance ckd.  We have started Coreg for his blood pressure but now has bradycardia and this has been stopped.  He is now on chlorthalidone.  He is also on clonidine hydralazine and amlodipine as well.     Patient admitted this time with severe nausea and vomiting.  He had multiple ER visits prior to this admission and now admitted here for the similar complaints.  He was identified as a right upper lobe pneumonia and is being treated for such.  Nausea and vomiting is associated with possible pneumonia versus gastroparesis.  Overall his symptoms has improved with antibiotics.  He is ready to be discharged today    Past Medical History:   Diagnosis Date   • Allergic rhinitis    • Backache    • Chest pain, unspecified    • Chronic obstructive lung disease (CMS/HCC)    • Chronic renal impairment    • Chronic rhinitis    • CKD (chronic kidney disease), unspecified    • Colonic polyps    • Constipation    • Depressive disorder    • Diverticular disease of colon    • Diverticulitis of colon    • Dyspnea    • Encounter for immunization     Hepatitis B   • Encounter for screening for other suspected endocrine disorder    • Essential hypertension    • Gastritis    • Generalized anxiety disorder    • GERD (gastroesophageal reflux disease)    •  Headache    • Hemorrhoids    • Hiatal hernia    • Hyperkalemia    • Hyperlipidemia     unspecified   • Hypothyroidism, unspecified    • Living alone    • Male erectile disorder    • Obesity    • Pain in lower limb    • Polyp of colon    • Temporomandibular joint disorder     upper & lower teeth show marked degree malocclusio    • Tobacco dependence syndrome    • Type 2 diabetes mellitus (CMS/HCC)    • Vitamin D deficiency, unspecified      Past Surgical History:   Procedure Laterality Date   • BACK SURGERY     • COLONOSCOPY      sm.sessile polyp in anamika.colon,sigmoid,rectum,a few diverticula in sig.,hemorrhoids   • ENDOSCOPY      nonerosive gastritis of antrum,biopsy obtained,reflex disease   • KNEE SURGERY      left meniscectomy   • LUMBAR LAMINECTOMY     • SHOULDER SURGERY      open reduction of right shoulder fracture Done at [facility] by Dr Zurita     Family History   Problem Relation Age of Onset   • Coronary artery disease Other    • Drug abuse Other    • Heart disease Other    • Hypertension Other    • Stroke Other      Social History   Substance Use Topics   • Smoking status: Former Smoker   • Smokeless tobacco: Never Used   • Alcohol use No     Allergies:  Darvon [propoxyphene]; Meperidine and related; Morphine and related; and Other     REVIEW OF SYSTEMS    Review of Systems   Constitutional: Negative for chills and fever.   Respiratory: Negative for chest tightness and shortness of breath.    Cardiovascular: Negative for chest pain and leg swelling.   Gastrointestinal: Positive for nausea and vomiting. Negative for abdominal pain and diarrhea.   Genitourinary: Negative for dysuria, flank pain and hematuria.   Neurological: Negative for dizziness, syncope and weakness.       Objective   OBJECTIVE   Vital Signs  Temp:  [96.3 °F (35.7 °C)-97.8 °F (36.6 °C)] 97.6 °F (36.4 °C)  Heart Rate:  [49-57] 52  Resp:  [18] 18  BP: (142-168)/(60-80) 142/68    Flowsheet Rows      First Filed Value   Admission Height  182.9 cm  "(72\") Documented at 08/20/2018 0821   Admission Weight  104 kg (230 lb) Documented at 08/20/2018 0821           I/O last 3 completed shifts:  In: 420 [P.O.:120; IV Piggyback:300]  Out: -     PHYSICAL EXAM    Physical Exam   Constitutional: He is oriented to person, place, and time. He appears well-developed.   HENT:   Head: Normocephalic.   Eyes: Pupils are equal, round, and reactive to light.   Cardiovascular: Normal rate, regular rhythm and normal heart sounds.    Pulmonary/Chest: Effort normal and breath sounds normal.   Abdominal: Soft. Bowel sounds are normal.   Musculoskeletal: He exhibits no edema.   Neurological: He is alert and oriented to person, place, and time.       RESULTS   Results Review:      Results from last 7 days  Lab Units 08/25/18 0633 08/24/18 0633 08/23/18  0642  08/20/18  0856   SODIUM mmol/L 134* 134* 133*  < > 134*   POTASSIUM mmol/L 4.2 4.0 4.1  < > 5.0   CHLORIDE mmol/L 105 105 104  < > 103   CO2 mmol/L 21.0* 20.0* 20.0*  < > 16.0*   BUN mg/dL 33* 33* 34*  < > 34*   CREATININE mg/dL 2.24* 2.09* 2.21*  < > 2.05*   CALCIUM mg/dL 8.3* 8.5 8.1*  < > 9.4   BILIRUBIN mg/dL  --   --   --   --  0.9   ALK PHOS U/L  --   --   --   --  73   ALT (SGPT) U/L  --   --   --   --  17*   AST (SGOT) U/L  --   --   --   --  26   GLUCOSE mg/dL 107* 105* 220*  < > 333*   < > = values in this interval not displayed.    Estimated Creatinine Clearance: 37 mL/min (A) (by C-G formula based on SCr of 2.24 mg/dL (H)).                  Results from last 7 days  Lab Units 08/25/18 0633 08/24/18  0633 08/23/18  0611 08/22/18  0332 08/21/18  0428   WBC 10*3/mm3 8.65 10.45* 9.17 9.72 11.90*   HEMOGLOBIN g/dL 9.8* 10.2* 9.3* 10.0* 10.3*   PLATELETS 10*3/mm3 255 249 236 236 252              MEDICATIONS      amLODIPine 10 mg Oral Daily   aspirin 81 mg Oral Daily   atorvastatin 40 mg Oral Nightly   budesonide-formoterol 2 puff Inhalation BID - RT   cetirizine 5 mg Oral Nightly   chlorthalidone 12.5 mg Oral Daily "   citalopram 40 mg Oral Daily   CloNIDine 0.3 mg Oral Q8H   enoxaparin 40 mg Subcutaneous Q24H   fluticasone 2 spray Each Nare Daily   hydrALAZINE 100 mg Oral Q8H   insulin aspart 0-14 Units Subcutaneous 4x Daily AC & at Bedtime   insulin detemir 35 Units Subcutaneous Nightly   levothyroxine 25 mcg Oral Q AM   pantoprazole 40 mg Oral QAM   tamsulosin 0.4 mg Oral Daily        Prescriptions Prior to Admission   Medication Sig Dispense Refill Last Dose   • aspirin 81 MG EC tablet Take 1 tablet by mouth Daily. 30 tablet 0 Past Week at Unknown time   • atorvastatin (LIPITOR) 40 MG tablet TAKE 1 TABLET DAILY AT BEDTIME 30 tablet 0 Past Week at Unknown time   • budesonide-formoterol (SYMBICORT) 160-4.5 MCG/ACT inhaler Inhale 2 puffs 2 (Two) Times a Day.   Past Week at Unknown time   • cetirizine (zyrTEC) 5 MG tablet Take 1 tablet by mouth Daily. 30 tablet 0 Past Week at Unknown time   • citalopram (CeleXA) 40 MG tablet Take 40 mg by mouth Daily.   Past Week at Unknown time   • CloNIDine (CATAPRES) 0.3 MG tablet Take 0.3 mg by mouth 3 (Three) Times a Day.   Past Week at Unknown time   • esomeprazole (nexIUM) 40 MG capsule Take 40 mg by mouth 2 (Two) Times a Day.   Past Week at Unknown time   • hydrALAZINE (APRESOLINE) 100 MG tablet Take 100 mg by mouth Every 8 (Eight) Hours.   Past Week at Unknown time   • insulin aspart (novoLOG FLEXPEN) 100 UNIT/ML solution pen-injector sc pen Inject 20 Units under the skin into the appropriate area as directed 3 (Three) Times a Day As Needed (meals, sliding scale).   Past Week at Unknown time   • metFORMIN (GLUCOPHAGE) 500 MG tablet Take 500 mg by mouth Every Morning.   Past Week at Unknown time   • albuterol (PROAIR HFA) 108 (90 BASE) MCG/ACT inhaler Inhale 2 puffs 4 (Four) Times a Day. 1 inhaler 11 Unknown at Unknown time   • amLODIPine (NORVASC) 10 MG tablet Take 1 tablet by mouth Daily. 90 tablet 3 Unknown at Unknown time   • B-D UF III MINI PEN NEEDLES 31G X 5 MM misc USE 4 TIMES  DAILY. 120 each 11    • baclofen (LIORESAL) 10 MG tablet Take 5-10 mg by mouth 3 (Three) Times a Day As Needed for Muscle Spasms. Take one-half to one tablet by mouth three times daily as needed for muscle spasms   Unknown at Unknown time   • carvedilol (COREG) 6.25 MG tablet Take 1 tablet by mouth Every 12 (Twelve) Hours. 60 tablet 0 Unknown at Unknown time   • clobetasol (TEMOVATE) 0.05 % external solution Apply 1 application topically to the appropriate area as directed Daily. Apply topically to scalp daily   Unknown at Unknown time   • fluticasone (FLONASE) 50 MCG/ACT nasal spray USE 2 SPRAYS IN EACH NOSTRIL DAILY. 16 g 0 Unknown at Unknown time   • insulin glargine (LANTUS) 100 UNIT/ML injection Inject 30 Units under the skin every night.   Unknown at Unknown time   • Insulin Pen Needle (PEN NEEDLES) 31G X 6 MM misc As Directed   5/31/2018 at Unknown time   • levothyroxine (SYNTHROID, LEVOTHROID) 50 MCG tablet Take 50 mcg by mouth Daily.   Unknown at Unknown time   • oxyCODONE-acetaminophen (PERCOCET)  MG per tablet Take 1 tablet by mouth Every 6 (Six) Hours As Needed for Moderate Pain .   Unknown at Unknown time   • sodium polystyrene (KAYEXALATE) 15 GM/60ML suspension Take 15 g by mouth 1 (One) Time. Take 60 ml (15 gm) by mouth three times weekly   Unknown at Unknown time   • tamsulosin (FLOMAX) 0.4 MG capsule 24 hr capsule Take 1 capsule by mouth Daily. 90 capsule 3 Unknown at Unknown time     Assessment/Plan   ASSESSMENT / PLAN    Active Problems:    Diabetes mellitus type 2, insulin dependent (CMS/HCC)    Hypertensive urgency    Pneumonia of right upper lobe due to infectious organism (CMS/HCC)    Leukocytosis    Intractable vomiting with nausea    Generalized abdominal pain    1.ckd 4 baseline creatinine now 2.0-2.1.  It  used to be close to 2.6-2.7 range.  He is off of Demadex and lisinopril.  Overall patient creatinine is stable.  He is now on chlorthalidone.  I will keep it for now since he has  responded well but if his GFR drops further then it may not be effective.  I think he is stable to be discharged and follow-up with his outpatient nephrologist Dr. Burch in 2 weeks    2.hypertension fairly well controlled at present continue with amlodipine  chlorthalidone hydralazine and clonidine.  His Coreg has been stopped due to bradycardia.  Not a candidate for ACE inhibitor    3.right upper lobe pneumonia responded well with IV antibiotics     4.diabetes mellitus type 2    Thank you for the referral will continue to follow the patient during the hospital stay         I discussed the patients findings and my recommendations with patient and primary care team         This document has been electronically signed by Feli Ramirez MD on August 25, 2018 10:40 AM

## 2018-08-25 NOTE — PLAN OF CARE
Problem: Patient Care Overview  Goal: Plan of Care Review  Outcome: Ongoing (interventions implemented as appropriate)  Pt rested quietly in bed this shift   08/24/18 7275   Coping/Psychosocial   Plan of Care Reviewed With patient   Plan of Care Review   Progress no change   OTHER   Outcome Summary pt ambulating halls today, no c/o pain, BP remains elevated     Goal: Discharge Needs Assessment  Outcome: Ongoing (interventions implemented as appropriate)      Problem: Pneumonia (Adult)  Goal: Signs and Symptoms of Listed Potential Problems Will be Absent, Minimized or Managed (Pneumonia)  Outcome: Ongoing (interventions implemented as appropriate)      Problem: Pain, Chronic (Adult)  Goal: Acceptable Pain/Comfort Level and Functional Ability  Outcome: Ongoing (interventions implemented as appropriate)

## 2018-08-25 NOTE — DISCHARGE INSTR - LAB
Dr Castro  1 Week  [phone]  Info was sent to the office. If they do not contact you within one business day, please call.      Dr Guzman  1 Week  [phone]  Info was sent to the office. If they do not contact you within one business day, please call.

## 2018-08-25 NOTE — DISCHARGE PLACEMENT REQUEST
"Marvel Vital (73 y.o. Male)     Date of Birth Social Security Number Address Home Phone MRN    1945  [address] [address] [phone] [id number]    Rastafari Marital Status          Gnosticism        Admission Date Admission Type Admitting Provider Attending Provider Department, Room/Bed    8/20/18 Emergency Marc Sidhu MD Echendu, Anthony W, MD [facility] 3 [facility], 374/1    Discharge Date Discharge Disposition Discharge Destination         Home or Self Care              Attending Provider:  Marc Sidhu MD    Allergies:  Darvon [Propoxyphene], Meperidine And Related, Morphine And Related, Other    Isolation:  None   Infection:  None   Code Status:  CPR    Ht:  182.9 cm (72.01\")   Wt:  106 kg (234 lb 3.2 oz)    Admission Cmt:  None   Principal Problem:  None                Active Insurance as of 8/20/2018     Primary Coverage     Payor Plan Insurance Group Employer/Plan Group    MEDICARE MEDICARE A & B      Payor Plan Address Payor Plan Phone Number Effective From Effective To    [address] [phone] 5/1/1990     [facility] [id number]       Subscriber Name Subscriber Birth Date Member ID       MARVEL VITAL 1945 [id number]           Secondary Coverage     Payor Plan Insurance Group Employer/Plan Group    [id number] [id number]     Payor Plan Address Payor Plan Phone Number Effective From Effective To    [address] [address] [phone] 11/1/2015     [facility] [id number]       Subscriber Name Subscriber Birth Date Member ID       MARVEL VITAL 1945 [id number]                 Emergency Contacts      (Rel.) Home Phone Work Phone Mobile Phone    Chuck Powell (Other) [phone] -- [phone]               History & Physical      Graciela Hurtado MD at 8/20/2018  3:42 PM                [facility] Medicine Admission      Date of Admission: 8/20/2018      Primary Care " Physician: Provider, No Known      Chief Complaint: nausea , vomiting and abdominal pain     HPI:  73 year old male patient who came to the ED due to nauseas, vomiting and abdominal pain. Everything started yesterday, initially he started with nausea and vomiting, this last described as non bilious non bloody, more than 30 episodes, most recently just having dry heaves. Abdominal pain is described as diffused, constant, non radiated, 7/10 in severity, aggravated by meals, not alleviating factors. At the ED BP was elevated with criteria for hypertensive urgency , labs were remarkable for leukocytosis , hyperglycemia , CT chest/abdomen showed Left upper lobe anterior segment perihilar as well as bibasilar superior segment small patchy opacities suspicious for multifocal pneumonia. He was admitted recently at another facility for same presentation.      Concurrent Medical History:   Past Medical History:   Diagnosis Date   • Allergic rhinitis    • Backache    • Chest pain, unspecified    • Chronic obstructive lung disease (CMS/HCC)    • Chronic renal impairment    • Chronic rhinitis    • CKD (chronic kidney disease), unspecified    • Colonic polyps    • Constipation    • Depressive disorder    • Diverticular disease of colon    • Diverticulitis of colon    • Dyspnea    • Encounter for immunization     Hepatitis B   • Encounter for screening for other suspected endocrine disorder    • Essential hypertension    • Gastritis    • Generalized anxiety disorder    • GERD (gastroesophageal reflux disease)    • Headache    • Hemorrhoids    • Hiatal hernia    • Hyperkalemia    • Hyperlipidemia     unspecified   • Hypothyroidism, unspecified    • Living alone    • Male erectile disorder    • Obesity    • Pain in lower limb    • Polyp of colon    • Temporomandibular joint disorder     upper & lower teeth show marked degree malocclusio    • Tobacco dependence syndrome    • Type 2 diabetes mellitus (CMS/HCC)    • Vitamin D  deficiency, unspecified          Past Surgical History:   Past Surgical History:   Procedure Laterality Date   • BACK SURGERY     • COLONOSCOPY      sm.sessile polyp in anamika.colon,sigmoid,rectum,a few diverticula in sig.,hemorrhoids   • ENDOSCOPY      nonerosive gastritis of antrum,biopsy obtained,reflex disease   • KNEE SURGERY      left meniscectomy   • LUMBAR LAMINECTOMY     • SHOULDER SURGERY      open reduction of right shoulder fracture Done at [facility] by Dr Zurita       Family History: family history includes Coronary artery disease in his other; Drug abuse in his other; Heart disease in his other; Hypertension in his other; Stroke in his other.     Social History:  reports that he has quit smoking. He has never used smokeless tobacco. He reports that he does not drink alcohol or use drugs.    Allergies:   Allergies   Allergen Reactions   • Darvon [Propoxyphene] Swelling   • Meperidine And Related    • Morphine And Related GI Intolerance     Pt states IV is ok, PO makes him nauseous.   • Other      Darvocet       Medications:   Prescriptions Prior to Admission   Medication Sig Dispense Refill Last Dose   • aspirin 81 MG EC tablet Take 1 tablet by mouth Daily. 30 tablet 0 Past Week at Unknown time   • atorvastatin (LIPITOR) 40 MG tablet TAKE 1 TABLET DAILY AT BEDTIME 30 tablet 0 Past Week at Unknown time   • budesonide-formoterol (SYMBICORT) 160-4.5 MCG/ACT inhaler Inhale 2 puffs 2 (Two) Times a Day.   Past Week at Unknown time   • cetirizine (zyrTEC) 5 MG tablet Take 1 tablet by mouth Daily. 30 tablet 0 Past Week at Unknown time   • citalopram (CeleXA) 40 MG tablet Take 40 mg by mouth Daily.   Past Week at Unknown time   • CloNIDine (CATAPRES) 0.3 MG tablet Take 0.3 mg by mouth 3 (Three) Times a Day.   Past Week at Unknown time   • esomeprazole (nexIUM) 40 MG capsule Take 40 mg by mouth 2 (Two) Times a Day.   Past Week at Unknown time   • hydrALAZINE (APRESOLINE) 100 MG tablet Take 100 mg by mouth Every 8 (Eight)  Hours.   Past Week at Unknown time   • insulin aspart (novoLOG FLEXPEN) 100 UNIT/ML solution pen-injector sc pen Inject 20 Units under the skin into the appropriate area as directed 3 (Three) Times a Day As Needed (meals, sliding scale).   Past Week at Unknown time   • metFORMIN (GLUCOPHAGE) 500 MG tablet Take 500 mg by mouth Every Morning.   Past Week at Unknown time   • albuterol (PROAIR HFA) 108 (90 BASE) MCG/ACT inhaler Inhale 2 puffs 4 (Four) Times a Day. 1 inhaler 11 Unknown at Unknown time   • amLODIPine (NORVASC) 10 MG tablet Take 1 tablet by mouth Daily. 90 tablet 3 Unknown at Unknown time   • B-D UF III MINI PEN NEEDLES 31G X 5 MM misc USE 4 TIMES DAILY. 120 each 11    • baclofen (LIORESAL) 10 MG tablet Take 5-10 mg by mouth 3 (Three) Times a Day As Needed for Muscle Spasms. Take one-half to one tablet by mouth three times daily as needed for muscle spasms   Unknown at Unknown time   • carvedilol (COREG) 6.25 MG tablet Take 1 tablet by mouth Every 12 (Twelve) Hours. 60 tablet 0 Unknown at Unknown time   • clobetasol (TEMOVATE) 0.05 % external solution Apply 1 application topically to the appropriate area as directed Daily. Apply topically to scalp daily   Unknown at Unknown time   • fluticasone (FLONASE) 50 MCG/ACT nasal spray USE 2 SPRAYS IN EACH NOSTRIL DAILY. 16 g 0 Unknown at Unknown time   • insulin glargine (LANTUS) 100 UNIT/ML injection Inject 30 Units under the skin every night.   Unknown at Unknown time   • Insulin Pen Needle (PEN NEEDLES) 31G X 6 MM misc As Directed   5/31/2018 at Unknown time   • levothyroxine (SYNTHROID, LEVOTHROID) 50 MCG tablet Take 50 mcg by mouth Daily.   Unknown at Unknown time   • oxyCODONE-acetaminophen (PERCOCET)  MG per tablet Take 1 tablet by mouth Every 6 (Six) Hours As Needed for Moderate Pain .   Unknown at Unknown time   • sodium polystyrene (KAYEXALATE) 15 GM/60ML suspension Take 15 g by mouth 1 (One) Time. Take 60 ml (15 gm) by mouth three times weekly    Unknown at Unknown time   • tamsulosin (FLOMAX) 0.4 MG capsule 24 hr capsule Take 1 capsule by mouth Daily. 90 capsule 3 Unknown at Unknown time       Review of Systems:  Review of Systems all 12 point were reviewed and they were negative, except for nauseas , vomiting and diffuse abdominal pain.       Physical Exam:   Temp:  [97.6 °F (36.4 °C)-98.7 °F (37.1 °C)] 97.6 °F (36.4 °C)  Heart Rate:  [72-96] 92  Resp:  [20-21] 21  BP: (182-270)/() 189/84  Physical Exam  GENERAL APPEARANCE: Well developed, well nourished, alert and cooperative, , not acute distress.  HEENT: normocephalic. PERRL, EOMI, vision is grossly intact.: External auditory canals and tympanic membranes clear, hearing grossly intact. No nasal discharge. Oral cavity and pharynx normal. No inflammation, swelling, exudate, or lesions. Teeth and gingiva in good general condition.  NECK: Neck supple, non-tender without lymphadenopathy, masses or thyromegaly.  CARDIAC: Normal S1 and S2. RRR, not M/R/G.   LUNGS: Clear to auscultation and percussion without rales, rhonchi, wheezing or diminished breath sounds.  ABDOMEN: diffusely tenderness, BS increased, not organomegaly,  MUSKULOSKELETAL: ROM intact spine and extremities. No joint erythema or tenderness. Normal muscular development. Normal gait.  BACK: Examination of the spine reveals normal gait and posture, no spinal deformity, symmetry of spinal muscles, without tenderness, decreased range of motion or muscular spasm.  EXTREMITIES: No significant deformity or joint abnormality. No edema. Peripheral pulses intact. No varicosities.  NEUROLOGICAL: CN II-XII intact. Strength and sensation symmetric and intact throughout. Reflexes 2+ throughout. Cerebellar testing normal.  SKIN: Skin normal color, texture and turgor with no lesions or eruptions.  PSYCHIATRIC: oriented x 3 .  good judgement and reason, without hallucinations, abnormal affect or abnormal behaviors during the examination.  not  suicidal.      Results Reviewed:  I have personally reviewed current lab, radiology, and data and agree with results.  Lab Results (last 24 hours)     Procedure Component Value Units Date/Time    Lactic Acid, Plasma [[id number]]  (Normal) Collected:  08/20/18 0952    Specimen:  Blood Updated:  08/20/18 1013     Lactate 1.8 mmol/L     Blood Culture - Blood, [[id number]] Collected:  08/20/18 0957    Specimen:  Blood from Arm, Right Updated:  08/20/18 1001    Blood Culture - Blood, [[id number]] Collected:  08/20/18 0957    Specimen:  Blood from Hand, Right Updated:  08/20/18 1000    [place] Draw [[id number]] Collected:  08/20/18 0856    Specimen:  Blood Updated:  08/20/18 1000    Narrative:       The following orders were created for panel order [place] Draw.  Procedure                               Abnormality         Status                     ---------                               -----------         ------                     Light Blue Top[[id number]]                                   Final result               Green Top (Gel)[[id number]]                                  Final result               Lavender Top[[id number]]                                     Final result               Gold Top - SST[[id number]]                                   Final result                 Please view results for these tests on the individual orders.    Light Blue Top [[id number]] Collected:  08/20/18 0856    Specimen:  Blood Updated:  08/20/18 1000     Extra Tube hold for add-on     Comment: Auto resulted       Green Top (Gel) [[id number]] Collected:  08/20/18 0856    Specimen:  Blood Updated:  08/20/18 1000     Extra Tube Hold for add-ons.     Comment: Auto resulted.       Lavender Top [[id number]] Collected:  08/20/18 0856    Specimen:  Blood Updated:  08/20/18 1000     Extra Tube hold for add-on     Comment: Auto resulted       Gold Top - SST [[id number]] Collected:  08/20/18 0856    Specimen:  Blood Updated:  08/20/18 1000     Extra Tube  Hold for add-ons.     Comment: Auto resulted.       Amylase [[id number]]  (Abnormal) Collected:  08/20/18 0856    Specimen:  Blood Updated:  08/20/18 0922     Amylase 38 (L) U/L     Comprehensive Metabolic Panel [[id number]]  (Abnormal) Collected:  08/20/18 0856    Specimen:  Blood Updated:  08/20/18 0914     Glucose 333 (H) mg/dL      BUN 34 (H) mg/dL      Creatinine 2.05 (H) mg/dL      Sodium 134 (L) mmol/L      Potassium 5.0 mmol/L      Chloride 103 mmol/L      CO2 16.0 (L) mmol/L      Calcium 9.4 mg/dL      Total Protein 7.2 g/dL      Albumin 4.00 g/dL      ALT (SGPT) 17 (L) U/L      AST (SGOT) 26 U/L      Alkaline Phosphatase 73 U/L      Total Bilirubin 0.9 mg/dL      eGFR Non African Amer 32 (L) mL/min/1.73      Globulin 3.2 gm/dL      A/G Ratio 1.3 g/dL      BUN/Creatinine Ratio 16.6     Anion Gap 15.0 mmol/L     Narrative:       The MDRD GFR formula is only valid for adults with stable renal function between ages 18 and 70.    Lipase [[id number]]  (Normal) Collected:  08/20/18 0856    Specimen:  Blood Updated:  08/20/18 0914     Lipase 25 U/L     Urinalysis, Microscopic Only - Urine, Clean Catch [[id number]]  (Abnormal) Collected:  08/20/18 0856    Specimen:  Urine from Urine, Clean Catch Updated:  08/20/18 0909     RBC, UA 3-5 (A) /HPF      WBC, UA 0-2 /HPF      Bacteria, UA None Seen /HPF      Squamous Epithelial Cells, UA None Seen /HPF      Hyaline Casts, UA 0-2 /LPF      Methodology Automated Microscopy    Urinalysis With Microscopic If Indicated (No Culture) - Urine, Clean Catch [[id number]]  (Abnormal) Collected:  08/20/18 0856    Specimen:  Urine from Urine, Clean Catch Updated:  08/20/18 0908     Color, UA Yellow     Appearance, UA Clear     pH, UA 5.5     Specific Gravity, UA 1.016     Glucose, UA >=1000 mg/dL (3+) (A)     Ketones, UA Negative     Bilirubin, UA Negative     Blood, UA Negative     Protein, UA >=300 mg/dL (3+) (A)     Leuk Esterase, UA Negative     Nitrite, UA Negative     Urobilinogen,  UA 0.2 E.U./dL    CBC & Differential [[id number]] Collected:  08/20/18 0856    Specimen:  Blood Updated:  08/20/18 0904    Narrative:       The following orders were created for panel order CBC & Differential.  Procedure                               Abnormality         Status                     ---------                               -----------         ------                     CBC Auto Differential[[id number]]        Abnormal            Final result                 Please view results for these tests on the individual orders.    CBC Auto Differential [[id number]]  (Abnormal) Collected:  08/20/18 0856    Specimen:  Blood Updated:  08/20/18 0904     WBC 17.14 (H) 10*3/mm3      RBC 3.70 (L) 10*6/mm3      Hemoglobin 12.0 (L) g/dL      Hematocrit 33.3 (L) %      MCV 90.0 fL      MCH 32.4 pg      MCHC 36.0 g/dL      RDW 12.5 %      RDW-SD 40.7 fl      MPV 10.8 fL      Platelets 263 10*3/mm3      Neutrophil % 80.1 (H) %      Lymphocyte % 8.4 (L) %      Monocyte % 9.7 %      Eosinophil % 1.0 %      Basophil % 0.4 %      Immature Grans % 0.4 %      Neutrophils, Absolute 13.74 (H) 10*3/mm3      Lymphocytes, Absolute 1.44 10*3/mm3      Monocytes, Absolute 1.66 (H) 10*3/mm3      Eosinophils, Absolute 0.17 10*3/mm3      Basophils, Absolute 0.06 10*3/mm3      Immature Grans, Absolute 0.07 (H) 10*3/mm3         Imaging Results (last 24 hours)     Procedure Component Value Units Date/Time    CT Abdomen Pelvis Without Contrast [[id number]] Collected:  08/20/18 1111     Updated:  08/20/18 1223    Narrative:         PROCEDURE: Ct abdomen and pelvis without contrast    REASON FOR EXAM: vomiting    FINDINGS: Comparison study dated  May 4, 2014. Axial computer  tomography sequential imaging was performed from the diaphragms  through the symphysis pubis without IV contrast administration.  Sagittal and coronal reformates was performed. This exam was  performed according to our departmental dose optimization  program, which includes  automated exposure control, adjustment of  the mA and/or KV according to patient size and/or use of  iterative reconstruction technique.     Small paraseptal blebs are seen involving left lung base. Left  upper lobe anterior segment perihilar as well as bibasilar  superior segment small patchy opacities. Very small bilateral  pleural effusions.    The liver is normal. The gallbladder is normal. The biliary  system is normal. The pancreas is normal. The spleen is normal.  Bilateral adrenal glands are normal. Right kidney and ureter are  normal. Left kidney and ureter are normal. The bladder is normal.  The prostate is normal. The hollow viscera is normal. The  appendix is identified appears normal. No lymphadenopathy in the  abdomen or pelvis. Atherosclerotic vascular calcifications of the  abdominal aorta. No acute osseous abnormality.      Impression:       1. Left upper lobe anterior segment perihilar as well as  bibasilar superior segment small patchy opacities suspicious for  multifocal pneumonia..  2. Very small bilateral pleural effusions..  3. Otherwise unremarkable unenhanced CT abdomen pelvis study..    Electronically signed by:  Chuck Cary MD  8/20/2018 12:22 PM CDT  Workstation: [id number]    CT Head Without Contrast [[id number]] Collected:  08/20/18 1109     Updated:  08/20/18 1146    Narrative:       Headaches.    CT, head without intravenous contrast.    Comparison is made with CT dated August 6, 2016.    Radiation dose-limiting techniques also utilized, including  automated exposure control and adjustment of mA and/or KVP to the  patient size according to ALARA (as low as reasonably  achievable).    There is mild atrophy. The ventricles are normal in size. No  midline shift is identified. There are mild periventricular and  white matter hypodensities. There is no evidence of acute  ischemia, intracranial hemorrhage or mass. No acute abnormality  is seen in the posterior fossa. No pituitary lesion  is  identified.    There is mucosal thickening in the right maxillary sinus. The  calvarium is intact.      Impression:       CONCLUSION:  1. No acute intracranial abnormality is identified.  2. Mild atrophy and mild microvascular changes.    Electronically signed by:  Segundo Prasad MD  8/20/2018 11:45  AM CDT Workstation: [id number]    XR Chest 1 View [[id number]] Collected:  08/20/18 0950     Updated:  08/20/18 1023    Narrative:         PROCEDURE: Single chest view AP    REASON FOR EXAM:htn    FINDINGS: Comparison exam dated May 31, 2014. Cardiac and  pulmonary vasculature are normal. Right upper lobe small patchy  opacity. Lungs are otherwise clear. Pleural spaces are normal. No  acute osseous abnormality. Left clavicle mid shaft old healed  fracture.      Impression:       1.  Right upper lobe small patchy opacity suspicious for  pneumonia. Recommend follow chest x-ray in 7-10 days to document  improvement and/or resolution.    Electronically signed by:  Chuck Cary MD  8/20/2018 10:22 AM CDT  Workstation: [id number]            Assessment/Plan :    Aspiration pneumonia   - start zosyn and duoneb, secondary to recurrent vomiting       Hypertensive urgency   - maybe secondary to dry heave/recthing, missing home emds, will resume home meds, add PRN IV labetalol and vasotec , will monitor and adjust accordingly       Intractable nausea and vomiting  - maybe secondary to gastroparesis, this is being recurrent lately, will start reglan, may consider GI consult if not improvement     Generalized abdominal pain     - gastritis versus PUD versus gastroparesis  - will order protonix, IV reglan , clear liquid diet     Morbid obesity   Weight loss, diet and exercise recommended     DM type 2   - complicated with hyperglycemia  Start IV fluids, insulin sliding scale, continue home regimen of insulin       Leukocytosis   - likely reactive versus hemoconcentration                 I discussed the patient's findings and my  recommendations with: patient      Code status and advance care of plan was discussed with patient and want to be  Full code, has not POA , has not decision in regards yet     Graciela Hurtado MD                        Electronically signed by Graciela Hurtado MD at 2018  4:01 PM        [address] [address]  Phone:  [phone]  Fax:   Date: Aug 25, 2018      Ambulatory Referral to Home Health     Patient:  Dustin Moulton MRN:  [id number]   [address] [facility] [address] :  1945  SSN:    Phone: [phone] Sex:  M      INSURANCE PAYOR PLAN GROUP # SUBSCRIBER ID   Primary:  Secondary:    MEDICARE  UMR [id number]  [id number]    [id number]      Referring Provider Information:  MARGARET COLLINS Phone: [phone] Fax:       Referral Information:   # Visits:  1 Referral Type: Home Health [42]   Urgency:  Routine Referral Reason: Specialty Services Required   Start Date: Aug 25, 2018 End Date:  To be determined by Insurer   Diagnosis: Pneumonia of right upper lobe due to infectious organism (CMS/HCC) (J18.1 [ICD-10-CM] 486 [ICD-9-CM])      Refer to Dept:   Refer to Provider:   Refer to Facility:       Face to Face Visit Date: 2018  Follow-up Provider for Plan of Care? I treated the patient in an acute care facility and will not continue treatment after discharge.  Follow-up Provider: YANIRA MELENDREZ [[id number]]  Reason/Clinical Findings: transition visit  Describe mobility limitations that make leaving home difficult: deconditioning  Nursing/Therapeutic Services Requested: Skilled Nursing  Skilled nursing orders: Other  Frequency: 1 Week 1     This document serves as a request of services and does not constitute Insurance authorization or approval of services.  To determine eligibility, please contact the members Insurance carrier to verify and review coverage.     If you have medical  questions regarding this request for services. Please contact [address] at [phone] between the hours of 8:00am - 5:00pm (Mon-Fri).             Verbal Order Mode: Telephone with readback   Authorizing Provider: Saravanan Stark DO  Authorizing Provider's NPI: [id number]     Order Entered By: Mariel Horne RN 8/25/2018 11:55 AM     Electronically signed by:

## 2018-08-26 NOTE — OUTREACH NOTE
Prep Survey      Responses   Facility patient discharged from?  [facility]   Is patient eligible?  Yes   Discharge diagnosis  HTN, PN RUL, CKD, DM2   Does the patient have one of the following disease processes/diagnoses(primary or secondary)?  COPD/Pneumonia   Does the patient have Home health ordered?  Yes   What is the Home health agency?   [facility]  for transitional care visit   Is there a DME ordered?  No   Prep survey completed?  Yes          Antonia Menjivar RN

## 2018-08-30 NOTE — OUTREACH NOTE
COPD/PN Week 1 Survey      Responses   Facility patient discharged from?  [facility]   Does the patient have one of the following disease processes/diagnoses(primary or secondary)?  COPD/Pneumonia   Is there a successful TCM telephone encounter documented?  No   Was the primary reason for admission:  Pneumonia   Week 1 attempt successful?  No   Unsuccessful attempts  Attempt 1          Elba Michaud RN

## 2018-09-04 NOTE — OUTREACH NOTE
COPD/PN Week 1 Survey      Responses   Facility patient discharged from?  [place]   Does the patient have one of the following disease processes/diagnoses(primary or secondary)?  COPD/Pneumonia   Is there a successful TCM telephone encounter documented?  No   Was the primary reason for admission:  Pneumonia   Week 1 attempt successful?  Yes   Call start time  1146   Call end time  1157   Discharge diagnosis  HTN, PN RUL, CKD, DM2   Is patient permission given to speak with other caregiver?  Yes   Person spoke with today (if not patient) and relationship  son   Meds reviewed with patient/caregiver?  Yes   Is the patient having any side effects they believe may be caused by any medication additions or changes?  No   Does the patient have all medications ordered at discharge?  Yes   Is the patient taking all medications as directed (includes completed medication regime)?  Yes   Did the patient receive a copy of their discharge instructions?  Yes   Nursing interventions  Reviewed instructions with patient   What is the patient's perception of their health status since discharge?  Improving   Nursing Interventions  Nurse provided patient education   Are the patient's immunizations up to date?   Yes   Nursing interventions  Advised patient to discuss with provider at next visit   Is the patient/caregiver able to teach back the hierarchy of who to call/visit for symptoms/problems? PCP, Specialist, Home health nurse, Urgent Care, ED, 911  Yes   Additional teach back comments  Son states he needs better control of his DM.  He says he is stubborn and won't follow a good diet plan.   Is the patient/caregiver able to teach back signs and symptoms of worsening condition:  Fever/chills, Shortness of breath, Chest pain   Is the patient/caregiver able to teach back importance of completing antibiotic course of treatment?  Yes   Week 1 call completed?  Yes          Lisette Schultz RN

## 2018-09-04 NOTE — PROGRESS NOTES
Subjective   Dustin Giacomo Moulton is a 73 y.o. male.   Cc: Establish Care  History of Present Illness The patient comes in for check of their chronic medical issues which include Diabetes Mellitis,Hypertension and Hypercholesterolemia. He was in the hospital during the last part of August. He has had some nausea, but no vomiting . His blood sugars are doing well.      The following portions of the patient's history were reviewed and updated as appropriate: allergies, current medications, past family history, past medical history, past social history, past surgical history and problem list.    Review of Systems   Constitutional: Negative for fatigue and fever.   Respiratory: Negative for cough, chest tightness and stridor.    Cardiovascular: Negative for chest pain and leg swelling.   Gastrointestinal: Positive for nausea. Negative for vomiting.       Objective   Physical Exam   Constitutional: He appears well-developed and well-nourished.   HENT:   Head: Normocephalic and atraumatic.   Right Ear: External ear normal.   Left Ear: External ear normal.   Nose: Nose normal.   Mouth/Throat: Oropharynx is clear and moist.   Eyes: Pupils are equal, round, and reactive to light.   Neck: Normal range of motion.   Cardiovascular: Normal rate, regular rhythm and normal heart sounds.  Exam reveals no gallop and no friction rub.    No murmur heard.  Pulmonary/Chest: Effort normal and breath sounds normal. No respiratory distress. He has no wheezes. He has no rales.   Abdominal: Soft. Bowel sounds are normal. He exhibits no distension. There is no tenderness.   Skin: Skin is warm.   Vitals reviewed.        Assessment/Plan   Dustin was seen today for follow-up and hospital follow up.    Diagnoses and all orders for this visit:    ANGELES (acute kidney injury) (CMS/[facility])    Essential hypertension    Diabetes mellitus type 2, insulin dependent (CMS/[facility])  -     Comprehensive metabolic panel; Future  -     Hemoglobin A1c; Future  -     Lipid  panel; Future  -     CBC w AUTO Differential; Future    Hypothyroidism, unspecified type  -     T4, free; Future  -     TSH; Future      Return to the clinic in 6 weeks.  Will contact with results as needed.

## 2018-09-13 NOTE — OUTREACH NOTE
COPD/PN Week 2 Survey      Responses   Facility patient discharged from?  [place]   Does the patient have one of the following disease processes/diagnoses(primary or secondary)?  COPD/Pneumonia   Was the primary reason for admission:  Pneumonia   Week 2 attempt successful?  No   Unsuccessful attempts  Attempt 1          Prashant Moulton RN

## 2018-09-17 NOTE — OUTREACH NOTE
COPD/PN Week 2 Survey      Responses   Facility patient discharged from?  [place]   Does the patient have one of the following disease processes/diagnoses(primary or secondary)?  COPD/Pneumonia   Was the primary reason for admission:  Pneumonia   Week 2 attempt successful?  No   Unsuccessful attempts  Attempt 2          Prashant Moulton RN

## 2018-09-20 NOTE — OUTREACH NOTE
COPD/PN Week 2 Survey      Responses   Facility patient discharged from?  [place]   Does the patient have one of the following disease processes/diagnoses(primary or secondary)?  COPD/Pneumonia   Was the primary reason for admission:  Pneumonia   Week 2 attempt successful?  Yes   Call start time  1645   Call end time  1650   Discharge diagnosis  HTN, PN RUL, CKD, DM2   Is patient permission given to speak with other caregiver?  Yes   Meds reviewed with patient/caregiver?  Yes   Is the patient having any side effects they believe may be caused by any medication additions or changes?  No   Does the patient have all medications ordered at discharge?  Yes   Is the patient taking all medications as directed (includes completed medication regime)?  Yes   Medication comments  Nothing new since D/C   Does the patient have a primary care provider?   Yes   Does the patient have an appointment with their PCP or pulmonologist within 7 days of discharge?  Yes   Has the patient kept scheduled appointments due by today?  Yes   What is the Home health agency?   [facility] for transitional care visit   Has home health visited the patient within 72 hours of discharge?  No   Has all DME been delivered?  No   Psychosocial issues?  No   Did the patient receive a copy of their discharge instructions?  Yes   Nursing interventions  Reviewed instructions with patient   What is the patient's perception of their health status since discharge?  Improving   Nursing Interventions  Nurse provided patient education   Are the patient's immunizations up to date?   Yes   Nursing interventions  Advised patient to discuss with provider at next visit   Is the patient/caregiver able to teach back the hierarchy of who to call/visit for symptoms/problems? PCP, Specialist, Home health nurse, Urgent Care, ED, 911  Yes   Additional teach back comments  Has had Shingles shot.  Says BS is better, 280 recently, has been a long since 120's.   Is the  patient/caregiver able to teach back signs and symptoms of worsening condition:  Fever/chills, Chest pain, Shortness of breath   Is the patient/caregiver able to teach back importance of completing antibiotic course of treatment?  Yes   Week 2 call completed?  Yes          Lisette Schultz RN

## 2018-09-30 NOTE — OUTREACH NOTE
COPD/PN Week 3 Survey      Responses   Facility patient discharged from?  [place]   Does the patient have one of the following disease processes/diagnoses(primary or secondary)?  COPD/Pneumonia   Was the primary reason for admission:  Pneumonia   Week 3 attempt successful?  No   Unsuccessful attempts  Attempt 1          Lisette Schultz RN

## 2018-10-02 NOTE — OUTREACH NOTE
COPD/PN Week 3 Survey      Responses   Facility patient discharged from?  [place]   Does the patient have one of the following disease processes/diagnoses(primary or secondary)?  COPD/Pneumonia   Was the primary reason for admission:  Pneumonia   Week 3 attempt successful?  No   Unsuccessful attempts  Attempt 2          Prashant Moulton RN

## 2018-10-03 NOTE — TELEPHONE ENCOUNTER
"    Reason for Disposition  • [1] Caller requesting NON-URGENT health information AND [2] PCP's office is the best resource    Additional Information  • Negative: [1] Caller is not with the adult (patient) AND [2] reporting urgent symptoms  • Negative: Lab result questions  • Negative: Medication questions  • Negative: Caller cannot be reached by phone  • Negative: Caller has already spoken to PCP or another triager  • Negative: RN needs further essential information from caller in order to complete triage  • Negative: Requesting regular office appointment    Answer Assessment - Initial Assessment Questions  1. REASON FOR CALL or QUESTION: \"What is your reason for calling today?\" or \"How can I best help you?\" or \"What question do you have that I can help answer?\"      Patient was in the hospital about 1 month ago, it was recommended that he have a scopy for his stomach problems and he didn't think it was necessary.  He now thinks he would like to follow MD recommendation. Advised to call MD Dr Venegas for assistance.  Caller transferred to the [facility]  for assistance to Dr Venegas office.    Protocols used: INFORMATION ONLY CALL-ADULT-      "

## 2018-10-03 NOTE — TELEPHONE ENCOUNTER
MARVEL VITAL IS HAVING PROBLEMS WITH SO MUCH GAS WHEN HE EATS..THAT CAUSES HIM TO VOMIT..he HAS LOST OVER 25LBS RECENTLY. AS HE HAS NOT BEEN HUNGRY AND NOT EATING MUCH BECAUSE OF THIS?HE IS ASKING FOR A REF TO [facility] TO HAVE THIS CK'D OUT..# [phone]